# Patient Record
Sex: FEMALE | Race: OTHER | Employment: OTHER | URBAN - METROPOLITAN AREA
[De-identification: names, ages, dates, MRNs, and addresses within clinical notes are randomized per-mention and may not be internally consistent; named-entity substitution may affect disease eponyms.]

---

## 2017-01-04 ENCOUNTER — TELEPHONE (OUTPATIENT)
Dept: FAMILY MEDICINE CLINIC | Facility: CLINIC | Age: 82
End: 2017-01-04

## 2017-01-05 ENCOUNTER — OFFICE VISIT (OUTPATIENT)
Dept: FAMILY MEDICINE CLINIC | Facility: CLINIC | Age: 82
End: 2017-01-05

## 2017-01-05 VITALS
TEMPERATURE: 99 F | WEIGHT: 136 LBS | SYSTOLIC BLOOD PRESSURE: 135 MMHG | DIASTOLIC BLOOD PRESSURE: 65 MMHG | HEART RATE: 66 BPM

## 2017-01-05 DIAGNOSIS — B35.6 TINEA CRURIS: ICD-10-CM

## 2017-01-05 DIAGNOSIS — N30.00 ACUTE CYSTITIS WITHOUT HEMATURIA: Primary | ICD-10-CM

## 2017-01-05 LAB
APPEARANCE: CLEAR
BILIRUBIN: NEGATIVE
GLUCOSE (URINE DIPSTICK): NEGATIVE MG/DL
KETONES (URINE DIPSTICK): NEGATIVE MG/DL
MULTISTIX LOT#: NORMAL NUMERIC
NITRITE, URINE: NEGATIVE
PH, URINE: 6.5 (ref 4.5–8)
PROTEIN (URINE DIPSTICK): NEGATIVE MG/DL
SPECIFIC GRAVITY: 1.01 (ref 1–1.03)
URINE-COLOR: YELLOW
UROBILINOGEN,SEMI-QN: 0.2 MG/DL (ref 0–1.9)

## 2017-01-05 PROCEDURE — 99214 OFFICE O/P EST MOD 30 MIN: CPT | Performed by: FAMILY MEDICINE

## 2017-01-05 PROCEDURE — 81002 URINALYSIS NONAUTO W/O SCOPE: CPT | Performed by: FAMILY MEDICINE

## 2017-01-05 PROCEDURE — G0463 HOSPITAL OUTPT CLINIC VISIT: HCPCS | Performed by: FAMILY MEDICINE

## 2017-01-05 RX ORDER — MELATONIN
400 DAILY
COMMUNITY
End: 2017-06-13

## 2017-01-05 RX ORDER — BENAZEPRIL HYDROCHLORIDE 10 MG/1
TABLET ORAL
Qty: 90 TABLET | Refills: 1 | Status: SHIPPED | OUTPATIENT
Start: 2017-01-05 | End: 2017-06-13

## 2017-01-05 NOTE — PROGRESS NOTES
1/5/2017  4:01 PM    Misti Zavaleta is a 80year old female. Chief complaint(s): Patient presents with: Follow - Up    HPI:     Misti Zavaleta primary complaint is regarding UTI and rash.      Patient is a 27-year-old female with long history of Parki 09/19/2011 03/07/2013 08/28/2014 09/26/2015      Pneumovax 23 (Lot Mgr)                          09/26/2015      Zoster (Shingles)     07/25/2016      Medications (Active prior to today's visit):    Current Outp EXAM:   Physical Exam    Constitutional: She appears well-developed and well-nourished. /65 mmHg  Pulse 66  Temp(Src) 98.9 °F (37.2 °C) (Oral)  Wt 136 lb (61.689 kg)  Breastfeeding? No   HENT:   Head: Normocephalic.    Right Ear: External ear normal [14688]  Urine Culture, Routine [E]    RECOMMENDATIONS given include: increase oral fluid intake, drink cranberry juice, avoid \"holding\" urine, urinate after intercourse, and Please, call our office with any questions or concerns.  Notify the doctor if th Econazole Nitrate 1 % External Cream 30 g 1      Sig: Apply to affected area(s) BID. RECOMMENDATIONS given include: Please, call our office with any questions or concerns.  Notify Dr Yulia Lam or the [facility] if there is a deterioration or worse

## 2017-01-20 ENCOUNTER — TELEPHONE (OUTPATIENT)
Dept: FAMILY MEDICINE CLINIC | Facility: CLINIC | Age: 82
End: 2017-01-20

## 2017-01-20 NOTE — TELEPHONE ENCOUNTER
Spoke to Sy Block, she was wondering if there was a time limit for using the antifungal cream, econazole due to adverse effects. Apparently, the area has greatly improved but per the pt she still has some itching.    I advised HH to have the daughter continue

## 2017-01-20 NOTE — TELEPHONE ENCOUNTER
HHN questioning how long pt is to use econazole 1% antifungal cream ?  Said groin area skin is looking better, itching has improved  Still has some discoloration

## 2017-01-21 NOTE — TELEPHONE ENCOUNTER
Call transferred by CSS: Avery Aguayo informed of Dr FISH's recommendation for her to be seen for re-evaluation if not improving. Avery Aguayo states there is definitely improvement but she will have the daughter call for appt if issue has not resolved.

## 2017-01-25 ENCOUNTER — OFFICE VISIT (OUTPATIENT)
Dept: FAMILY MEDICINE CLINIC | Facility: CLINIC | Age: 82
End: 2017-01-25

## 2017-01-25 VITALS
TEMPERATURE: 98 F | WEIGHT: 136 LBS | SYSTOLIC BLOOD PRESSURE: 133 MMHG | HEART RATE: 72 BPM | DIASTOLIC BLOOD PRESSURE: 75 MMHG

## 2017-01-25 DIAGNOSIS — L22 DIAPER RASH: Primary | ICD-10-CM

## 2017-01-25 PROCEDURE — G0463 HOSPITAL OUTPT CLINIC VISIT: HCPCS | Performed by: FAMILY MEDICINE

## 2017-01-25 PROCEDURE — 99213 OFFICE O/P EST LOW 20 MIN: CPT | Performed by: FAMILY MEDICINE

## 2017-01-25 NOTE — PROGRESS NOTES
1/25/2017  1:04 PM    Misti Zavaleta is a 80year old female.     Chief complaint(s): Patient presents with:  Rash Skin Problem (integumentary): Patient here with daughter states that the home health nurse called and spoke with Dr. Rosa Zafar regarding pt's rash n 09/19/2011 03/07/2013 08/28/2014 09/26/2015      Pneumovax 23 (Lot Mgr)                          09/26/2015      Zoster (Shingles)     07/25/2016      Medications (Active prior to today's visit):    Navneet Pugh well-nourished. /75 mmHg  Pulse 72  Temp(Src) 98.1 °F (36.7 °C) (Oral)  Wt 136 lb (61.689 kg)  Breastfeeding? No   HENT:   Head: Normocephalic. Right Ear: External ear normal.   Left Ear: External ear normal.   Nose: No rhinorrhea.    Mouth/Throat daughter was explained to keep the area clean and dry. If possible keep the diaper off during the night so he can dry out. To use Desitin as needed. FOLLOW-UP: Schedule a follow-up visit in prn .              Orders This Visit:  No orders of the define

## 2017-02-07 ENCOUNTER — TELEPHONE (OUTPATIENT)
Dept: FAMILY MEDICINE CLINIC | Facility: CLINIC | Age: 82
End: 2017-02-07

## 2017-02-16 ENCOUNTER — TELEPHONE (OUTPATIENT)
Dept: FAMILY MEDICINE CLINIC | Facility: CLINIC | Age: 82
End: 2017-02-16

## 2017-02-16 RX ORDER — CODEINE PHOSPHATE AND GUAIFENESIN 10; 100 MG/5ML; MG/5ML
5 SOLUTION ORAL EVERY 6 HOURS PRN
Qty: 120 ML | Refills: 1 | Status: SHIPPED | OUTPATIENT
Start: 2017-02-16 | End: 2017-06-13 | Stop reason: ALTCHOICE

## 2017-02-16 NOTE — TELEPHONE ENCOUNTER
Pt's daughter called stating her mother woke up with a cold this morning symptoms include cough, runny nose and sore throat. Daughter wants to know what can she get OTC for mom? Please advise.

## 2017-02-17 ENCOUNTER — TELEPHONE (OUTPATIENT)
Dept: FAMILY MEDICINE CLINIC | Facility: CLINIC | Age: 82
End: 2017-02-17

## 2017-02-17 NOTE — TELEPHONE ENCOUNTER
Keira would like to know if doctor would like her to continue to see pt. Pt has completed her therapy and has reached her goal. Anil Gutiérrez would like to know if doctor would still like nursing for the patient or should they discharge pt.

## 2017-02-17 NOTE — TELEPHONE ENCOUNTER
Dr. John Mcallister, please see message below. Spoke to Sy Block, [facility] RN. She states that the pt is doing well. VS have been stable, the pt had a rash to her groin that is healed. She is wondering if you would like to continue RN services or can the pt be DC'd?  Please a

## 2017-02-20 NOTE — TELEPHONE ENCOUNTER
Faxed over RX to [facility] pharmacy at [phone]. Called to inform patient rx was sent over.

## 2017-02-20 NOTE — TELEPHONE ENCOUNTER
Keira [facility] RN is calling back   Nurse Collette Rangel will be driving to her home now and have not got a response from MD   Please advise

## 2017-06-12 ENCOUNTER — TELEPHONE (OUTPATIENT)
Dept: INTERNAL MEDICINE CLINIC | Facility: CLINIC | Age: 82
End: 2017-06-12

## 2017-06-12 NOTE — TELEPHONE ENCOUNTER
Actions Requested: appt scheduled 6/13/17  Situation/Background   Problem: pimple to back   Onset: > 2days   Associated Symptoms: afebrile, pus visible not popped, painful red swollen, unable to describe the size as caller was son and pt Cape Verdean speaking

## 2017-06-13 ENCOUNTER — OFFICE VISIT (OUTPATIENT)
Dept: FAMILY MEDICINE CLINIC | Facility: CLINIC | Age: 82
End: 2017-06-13

## 2017-06-13 VITALS
HEART RATE: 67 BPM | TEMPERATURE: 98 F | WEIGHT: 142 LBS | BODY MASS INDEX: 28.63 KG/M2 | HEIGHT: 59 IN | DIASTOLIC BLOOD PRESSURE: 77 MMHG | SYSTOLIC BLOOD PRESSURE: 149 MMHG

## 2017-06-13 DIAGNOSIS — L89.311 DECUBITUS ULCER OF RIGHT BUTTOCK, STAGE 1: Primary | ICD-10-CM

## 2017-06-13 DIAGNOSIS — B35.1 ONYCHOMYCOSIS: ICD-10-CM

## 2017-06-13 DIAGNOSIS — I10 ESSENTIAL HYPERTENSION: ICD-10-CM

## 2017-06-13 PROCEDURE — G0463 HOSPITAL OUTPT CLINIC VISIT: HCPCS | Performed by: FAMILY MEDICINE

## 2017-06-13 PROCEDURE — 99214 OFFICE O/P EST MOD 30 MIN: CPT | Performed by: FAMILY MEDICINE

## 2017-06-13 RX ORDER — MELATONIN
400 DAILY
Qty: 90 TABLET | Refills: 2 | Status: SHIPPED | OUTPATIENT
Start: 2017-06-13 | End: 2018-03-12

## 2017-06-13 RX ORDER — CARVEDILOL 12.5 MG/1
12.5 TABLET ORAL 2 TIMES DAILY WITH MEALS
Qty: 180 TABLET | Refills: 2 | Status: SHIPPED | OUTPATIENT
Start: 2017-06-13 | End: 2018-03-12

## 2017-06-13 RX ORDER — BENAZEPRIL HYDROCHLORIDE 10 MG/1
TABLET ORAL
Qty: 90 TABLET | Refills: 1 | Status: SHIPPED | OUTPATIENT
Start: 2017-06-13 | End: 2018-01-09

## 2017-06-13 NOTE — PROGRESS NOTES
6/13/2017  1:36 PM    Collin Field is a 80year old female. Chief complaint(s): Patient presents with: Follow - Up: Patient here today for her regular check ups and also has a blister on her right buttock.      HPI:     Collin Field primary compla Gastroesophageal reflux disease    • Osteoarthritis    • Osteoporosis    • Parkinson's disease ([facility] [address]           Past Surgical History    APPENDECTOMY        Family History   Problem Relation Age of Onset   • Stroke Mother      cerebrovascular accident   • H MG Oral Tab EC 1 tablet po Q Day Disp:  Rfl:    Memantine HCl 10 MG Oral Tab  Disp:  Rfl: 11   Econazole Nitrate 1 % External Cream Apply to affected area(s) BID.  Disp: 30 g Rfl: 1   Mometasone Furoate 0.1 % External Cream Apply to affected area(s) BID Dis Result Value Ref Range   GLUCOSE (URINE DIPSTICK) NEGATIVE Negative mg/dL   BILIRUBIN NEGATIVE Negative   KETONES (URINE DIPSTICK) NEGATIVE Negative mg/dL   SPECIFIC GRAVITY 1.010 1.005 - 1.030   OCCULT BLOOD NON-HEMOLYZED TRACE Negative   PH, URINE 6.5 concerns. Notify Dr Yulia Lma or the [facility], [facility] if there is a deterioration or worsening of the medical condition. Also, inform the doctor with any new symptoms or medications' side effects. FOLLOW-UP: Schedule a follow-up visit in prn .         3.

## 2017-09-21 ENCOUNTER — OFFICE VISIT (OUTPATIENT)
Dept: FAMILY MEDICINE CLINIC | Facility: CLINIC | Age: 82
End: 2017-09-21

## 2017-09-21 VITALS
WEIGHT: 144 LBS | HEIGHT: 59 IN | HEART RATE: 82 BPM | SYSTOLIC BLOOD PRESSURE: 133 MMHG | TEMPERATURE: 98 F | DIASTOLIC BLOOD PRESSURE: 81 MMHG | BODY MASS INDEX: 29.03 KG/M2

## 2017-09-21 DIAGNOSIS — I10 ESSENTIAL HYPERTENSION: Primary | ICD-10-CM

## 2017-09-21 DIAGNOSIS — Z23 INFLUENZA VACCINATION ADMINISTERED AT CURRENT VISIT: ICD-10-CM

## 2017-09-21 DIAGNOSIS — I48.20 CHRONIC ATRIAL FIBRILLATION (HCC): ICD-10-CM

## 2017-09-21 PROCEDURE — G0008 ADMIN INFLUENZA VIRUS VAC: HCPCS | Performed by: FAMILY MEDICINE

## 2017-09-21 PROCEDURE — 99214 OFFICE O/P EST MOD 30 MIN: CPT | Performed by: FAMILY MEDICINE

## 2017-09-21 PROCEDURE — G0463 HOSPITAL OUTPT CLINIC VISIT: HCPCS | Performed by: FAMILY MEDICINE

## 2017-09-21 PROCEDURE — 90653 IIV ADJUVANT VACCINE IM: CPT | Performed by: FAMILY MEDICINE

## 2017-09-21 NOTE — PROGRESS NOTES
9/21/2017  3:14 PM    Collin Field is a 80year old female. Chief complaint(s): Patient presents with: Follow - Up  HTN    HPI:     Collin Field primary complaint is regarding CAD/HTN/A fib.      With regard to the atrial fibrillation, HTN she is Sister      hyperlipidemia   • Diabetes Sister      type 2   • Lung Disorder Father      pneumonia      Social History: Smoking status: Never Smoker                                                              Smokeless tobacco: Never Used throat. Respiratory: Negative for cough and wheezing. Cardiovascular: Negative for chest pain and palpitations. Gastrointestinal: Negative for nausea, vomiting, abdominal pain, diarrhea and constipation. Musculoskeletal: Negative for back pain. Negative   APPEARANCE CLEAR Clear   URINE-COLOR YELLOW Yellow   Multistix Lot# [id number] Numeric   Multistix Expiration Date 10/31/2017 Date   -URINE CULTURE, ROUTINE   Result Value Ref Range   Urine Culture 10-50,000 cfu/ml Multiple species present-probable

## 2017-09-22 ENCOUNTER — TELEPHONE (OUTPATIENT)
Dept: OBGYN CLINIC | Facility: CLINIC | Age: 82
End: 2017-09-22

## 2017-09-22 NOTE — TELEPHONE ENCOUNTER
I do not understand this message. I do not believe this patient has been seen by me or anyone from our group. I do not see any pending appointment.

## 2017-09-22 NOTE — TELEPHONE ENCOUNTER
Daughter is wondering if pt can get urine culture test as well. Will bring pt in for blood test tomorrow.

## 2017-09-23 ENCOUNTER — LAB ENCOUNTER (OUTPATIENT)
Dept: LAB | Age: 82
End: 2017-09-23
Attending: FAMILY MEDICINE
Payer: MEDICARE

## 2017-09-23 DIAGNOSIS — I48.20 CHRONIC ATRIAL FIBRILLATION (HCC): ICD-10-CM

## 2017-09-23 LAB
ALBUMIN SERPL BCP-MCNC: 3.6 G/DL (ref 3.5–4.8)
ALBUMIN/GLOB SERPL: 1 {RATIO} (ref 1–2)
ALP SERPL-CCNC: 78 U/L (ref 32–100)
ALT SERPL-CCNC: 12 U/L (ref 14–54)
ANION GAP SERPL CALC-SCNC: 6 MMOL/L (ref 0–18)
AST SERPL-CCNC: 23 U/L (ref 15–41)
BASOPHILS # BLD: 0.1 K/UL (ref 0–0.2)
BASOPHILS NFR BLD: 1 %
BILIRUB SERPL-MCNC: 0.7 MG/DL (ref 0.3–1.2)
BUN SERPL-MCNC: 29 MG/DL (ref 8–20)
BUN/CREAT SERPL: 20.6 (ref 10–20)
CALCIUM SERPL-MCNC: 9.1 MG/DL (ref 8.5–10.5)
CHLORIDE SERPL-SCNC: 109 MMOL/L (ref 95–110)
CHOLEST SERPL-MCNC: 206 MG/DL (ref 110–200)
CO2 SERPL-SCNC: 26 MMOL/L (ref 22–32)
CREAT SERPL-MCNC: 1.41 MG/DL (ref 0.5–1.5)
EOSINOPHIL # BLD: 0.2 K/UL (ref 0–0.7)
EOSINOPHIL NFR BLD: 3 %
ERYTHROCYTE [DISTWIDTH] IN BLOOD BY AUTOMATED COUNT: 13.5 % (ref 11–15)
GLOBULIN PLAS-MCNC: 3.5 G/DL (ref 2.5–3.7)
GLUCOSE SERPL-MCNC: 92 MG/DL (ref 70–99)
HCT VFR BLD AUTO: 38 % (ref 35–48)
HDLC SERPL-MCNC: 50 MG/DL
HGB BLD-MCNC: 12.7 G/DL (ref 12–16)
LDLC SERPL CALC-MCNC: 137 MG/DL (ref 0–99)
LYMPHOCYTES # BLD: 1.3 K/UL (ref 1–4)
LYMPHOCYTES NFR BLD: 22 %
MCH RBC QN AUTO: 31.3 PG (ref 27–32)
MCHC RBC AUTO-ENTMCNC: 33.3 G/DL (ref 32–37)
MCV RBC AUTO: 93.8 FL (ref 80–100)
MONOCYTES # BLD: 0.7 K/UL (ref 0–1)
MONOCYTES NFR BLD: 13 %
NEUTROPHILS # BLD AUTO: 3.6 K/UL (ref 1.8–7.7)
NEUTROPHILS NFR BLD: 62 %
NONHDLC SERPL-MCNC: 156 MG/DL
OSMOLALITY UR CALC.SUM OF ELEC: 297 MOSM/KG (ref 275–295)
PLATELET # BLD AUTO: 169 K/UL (ref 140–400)
PMV BLD AUTO: 9.7 FL (ref 7.4–10.3)
POTASSIUM SERPL-SCNC: 5 MMOL/L (ref 3.3–5.1)
PROT SERPL-MCNC: 7.1 G/DL (ref 5.9–8.4)
RBC # BLD AUTO: 4.05 M/UL (ref 3.7–5.4)
SODIUM SERPL-SCNC: 141 MMOL/L (ref 136–144)
TRIGL SERPL-MCNC: 97 MG/DL (ref 1–149)
TSH SERPL-ACNC: 2.97 UIU/ML (ref 0.45–5.33)
WBC # BLD AUTO: 5.8 K/UL (ref 4–11)

## 2017-09-23 PROCEDURE — 36415 COLL VENOUS BLD VENIPUNCTURE: CPT

## 2017-09-23 PROCEDURE — 80053 COMPREHEN METABOLIC PANEL: CPT

## 2017-09-23 PROCEDURE — 80061 LIPID PANEL: CPT

## 2017-09-23 PROCEDURE — 84443 ASSAY THYROID STIM HORMONE: CPT

## 2017-09-23 PROCEDURE — 85025 COMPLETE CBC W/AUTO DIFF WBC: CPT

## 2017-12-04 RX ORDER — ERGOCALCIFEROL 1.25 MG/1
CAPSULE ORAL
Qty: 12 CAPSULE | Refills: 0 | Status: SHIPPED | OUTPATIENT
Start: 2017-12-04 | End: 2018-02-16

## 2018-01-11 RX ORDER — BENAZEPRIL HYDROCHLORIDE 10 MG/1
TABLET ORAL
Qty: 90 TABLET | Refills: 0 | Status: SHIPPED | OUTPATIENT
Start: 2018-01-11 | End: 2018-03-22

## 2018-01-30 ENCOUNTER — TELEPHONE (OUTPATIENT)
Dept: FAMILY MEDICINE CLINIC | Facility: CLINIC | Age: 83
End: 2018-01-30

## 2018-01-30 NOTE — TELEPHONE ENCOUNTER
S[place]ankan speaking - pt's daughter called in and want to discuss the appt that happened on 9/23/2017. PT's daughter also wanted to know why labs were ordered.  Please advise

## 2018-02-01 NOTE — TELEPHONE ENCOUNTER
Please contact patient to set up an appointment with Dr. Richard Wills to discuss last appointment from 9/2017.

## 2018-02-15 RX ORDER — MEMANTINE HYDROCHLORIDE 10 MG/1
10 TABLET ORAL DAILY
Qty: 90 TABLET | Refills: 2 | Status: SHIPPED | OUTPATIENT
Start: 2018-02-15 | End: 2018-03-22

## 2018-02-15 NOTE — TELEPHONE ENCOUNTER
Dr. Collin Augustin patient's daughter is requesting more refills for medication MEMANTINE HCI 10MG. Please advise.

## 2018-02-16 RX ORDER — ERGOCALCIFEROL 1.25 MG/1
CAPSULE ORAL
Qty: 12 CAPSULE | Refills: 0 | Status: SHIPPED | OUTPATIENT
Start: 2018-02-16 | End: 2018-05-22

## 2018-03-13 RX ORDER — CARVEDILOL 12.5 MG/1
TABLET ORAL
Qty: 180 TABLET | Refills: 0 | Status: SHIPPED | OUTPATIENT
Start: 2018-03-13 | End: 2018-03-22

## 2018-03-13 RX ORDER — MELATONIN
Qty: 90 TABLET | Refills: 0 | Status: SHIPPED | OUTPATIENT
Start: 2018-03-13 | End: 2018-03-22

## 2018-03-22 ENCOUNTER — OFFICE VISIT (OUTPATIENT)
Dept: FAMILY MEDICINE CLINIC | Facility: CLINIC | Age: 83
End: 2018-03-22

## 2018-03-22 VITALS — HEART RATE: 85 BPM | TEMPERATURE: 98 F | SYSTOLIC BLOOD PRESSURE: 115 MMHG | DIASTOLIC BLOOD PRESSURE: 81 MMHG

## 2018-03-22 DIAGNOSIS — I48.20 CHRONIC ATRIAL FIBRILLATION (HCC): ICD-10-CM

## 2018-03-22 DIAGNOSIS — F02.80 LATE ONSET ALZHEIMER'S DISEASE WITHOUT BEHAVIORAL DISTURBANCE (HCC): ICD-10-CM

## 2018-03-22 DIAGNOSIS — G30.1 LATE ONSET ALZHEIMER'S DISEASE WITHOUT BEHAVIORAL DISTURBANCE (HCC): ICD-10-CM

## 2018-03-22 DIAGNOSIS — I10 ESSENTIAL HYPERTENSION: Primary | ICD-10-CM

## 2018-03-22 PROCEDURE — 99214 OFFICE O/P EST MOD 30 MIN: CPT | Performed by: FAMILY MEDICINE

## 2018-03-22 PROCEDURE — G0463 HOSPITAL OUTPT CLINIC VISIT: HCPCS | Performed by: FAMILY MEDICINE

## 2018-03-22 RX ORDER — CARVEDILOL 12.5 MG/1
TABLET ORAL
Qty: 180 TABLET | Refills: 2 | Status: SHIPPED | OUTPATIENT
Start: 2018-03-22 | End: 2019-02-21

## 2018-03-22 RX ORDER — MEMANTINE HYDROCHLORIDE 10 MG/1
10 TABLET ORAL 2 TIMES DAILY
Qty: 180 TABLET | Refills: 2 | Status: SHIPPED | OUTPATIENT
Start: 2018-03-22 | End: 2018-12-09

## 2018-03-22 RX ORDER — MELATONIN
Qty: 90 TABLET | Refills: 2 | Status: SHIPPED | OUTPATIENT
Start: 2018-03-22

## 2018-03-22 RX ORDER — QUETIAPINE 25 MG/1
100 TABLET, FILM COATED ORAL DAILY
COMMUNITY
Start: 2018-02-27 | End: 2019-01-10

## 2018-03-22 RX ORDER — PAROXETINE 30 MG/1
TABLET, FILM COATED ORAL
Qty: 90 TABLET | Refills: 1 | Status: SHIPPED | OUTPATIENT
Start: 2018-03-22

## 2018-03-22 RX ORDER — BENAZEPRIL HYDROCHLORIDE 10 MG/1
10 TABLET ORAL
Qty: 90 TABLET | Refills: 2 | Status: SHIPPED | OUTPATIENT
Start: 2018-03-22 | End: 2019-02-21

## 2018-03-22 NOTE — PROGRESS NOTES
3/22/2018  2:13 PM    Lucita Dyson is a 80year old female. Chief complaint(s): Patient presents with: Follow - Up    HPI:     Lucita Dyson primary complaint is regarding multiple issues. Patient to be evaluated for atrial fibrillation.   She • Congestive heart failure (CHF) (HCC)    • Gastroesophageal reflux disease    • Hypertension    • Osteoarthritis    • Osteoporosis    • Parkinson's disease ([facility] [address]    • Varicose veins       Past Surgical History:  No date: APPENDECTOMY   Family History TABLET(12.5 MG) BY MOUTH TWICE DAILY WITH MEALS Disp: 180 tablet Rfl: 2   ERGOCALCIFEROL 73403 units Oral Cap TAKE ONE CAPSULE BY MOUTH ONCE A WEEK Disp: 12 capsule Rfl: 0   ZOSTAVAX 10597 UNT/0.65ML Subcutaneous Recon Soln  Disp:  Rfl:    Acetaminophen (A Essential hypertension  (primary encounter diagnosis)  Chronic atrial fibrillation (hcc)  Late onset alzheimer's disease without behavioral disturbance    1. Essential hypertension  2.  Chronic atrial fibrillation (HCC)    MEDICATIONS:     Signed Prescrip Oral Tab 180 tablet 2      Sig: Take 1 tablet (10 mg total) by mouth 2 (two) times daily. DilTIAZem HCl 30 MG Oral Tab 270 tablet 2      Sig: Take 1 tablet (30 mg total) by mouth 3 (three) times daily.       PARoxetine HCl 30 MG Oral Tab 90 tablet 1

## 2018-03-26 ENCOUNTER — NURSE TRIAGE (OUTPATIENT)
Dept: FAMILY MEDICINE CLINIC | Facility: CLINIC | Age: 83
End: 2018-03-26

## 2018-03-26 ENCOUNTER — OFFICE VISIT (OUTPATIENT)
Dept: FAMILY MEDICINE CLINIC | Facility: CLINIC | Age: 83
End: 2018-03-26

## 2018-03-26 VITALS
DIASTOLIC BLOOD PRESSURE: 81 MMHG | BODY MASS INDEX: 28.66 KG/M2 | TEMPERATURE: 98 F | WEIGHT: 146 LBS | SYSTOLIC BLOOD PRESSURE: 158 MMHG | HEART RATE: 58 BPM | HEIGHT: 60 IN

## 2018-03-26 DIAGNOSIS — N30.00 ACUTE CYSTITIS WITHOUT HEMATURIA: Primary | ICD-10-CM

## 2018-03-26 LAB
APPEARANCE: CLEAR
BILIRUBIN: NEGATIVE
GLUCOSE (URINE DIPSTICK): NEGATIVE MG/DL
KETONES (URINE DIPSTICK): NEGATIVE MG/DL
MULTISTIX LOT#: NORMAL NUMERIC
PH, URINE: 6.5 (ref 4.5–8)
PROTEIN (URINE DIPSTICK): NEGATIVE MG/DL
SPECIFIC GRAVITY: 1.01 (ref 1–1.03)
URINE-COLOR: YELLOW
UROBILINOGEN,SEMI-QN: 0.2 MG/DL (ref 0–1.9)

## 2018-03-26 PROCEDURE — G0463 HOSPITAL OUTPT CLINIC VISIT: HCPCS | Performed by: FAMILY MEDICINE

## 2018-03-26 PROCEDURE — 81002 URINALYSIS NONAUTO W/O SCOPE: CPT | Performed by: FAMILY MEDICINE

## 2018-03-26 PROCEDURE — 99214 OFFICE O/P EST MOD 30 MIN: CPT | Performed by: FAMILY MEDICINE

## 2018-03-26 RX ORDER — CIPROFLOXACIN 500 MG/1
500 TABLET, FILM COATED ORAL 2 TIMES DAILY
Qty: 14 TABLET | Refills: 0 | Status: SHIPPED | OUTPATIENT
Start: 2018-03-26 | End: 2018-04-02

## 2018-03-26 NOTE — PROGRESS NOTES
3/26/2018  5:03 PM    Roseann Gutiérrez is a 80year old female. Chief complaint(s): Patient presents with:   Foot Pain: daughter states she noticed her mom not able to walk normal this morning     HPI:     Roseann Gutiérrez primary complaint is regarding po Vaccine, High Dose, Preserv Free                          09/19/2011 03/07/2013 08/28/2014 09/26/2015      Pneumovax 23          09/26/2015      Zoster Vaccine Live (Zostavax)                          07/25/2016      Medication PHYSICAL EXAM:   Physical Exam    Constitutional: She is oriented to person, place, and time. She appears well-developed and well-nourished.    /81   Pulse 58   Temp 97.8 °F (36.6 °C) (Oral)   Ht 5' (1.524 m)   Wt 146 lb (66.2 kg)   BMI 28.51 kg follow-up appointment in  prn.          Orders This Visit:    Orders Placed This Encounter      POC Urinalysis, Manual Dip without microscopy [09691]      Urine Culture, Routine [E]    Meds This Visit:    Signed Prescriptions Disp Refills    Ciprofloxacin H

## 2018-03-26 NOTE — TELEPHONE ENCOUNTER
Action Requested: Summary for Provider     []  Critical Lab, Recommendations Needed  [] Need Additional Advice  []   FYI    []   Need Orders  [] Need Medications Sent to Pharmacy  []  Other     SUMMARY: CREATED APPT, weakness, poss UTI    Pt's daughter yelena

## 2018-05-23 RX ORDER — ERGOCALCIFEROL 1.25 MG/1
CAPSULE ORAL
Qty: 12 CAPSULE | Refills: 1 | Status: SHIPPED | OUTPATIENT
Start: 2018-05-23 | End: 2018-11-19

## 2018-11-05 RX ORDER — ERGOCALCIFEROL 1.25 MG/1
CAPSULE ORAL
Qty: 12 CAPSULE | Refills: 0 | OUTPATIENT
Start: 2018-11-05

## 2018-11-16 ENCOUNTER — OFFICE VISIT (OUTPATIENT)
Dept: FAMILY MEDICINE CLINIC | Facility: CLINIC | Age: 83
End: 2018-11-16
Payer: MEDICARE

## 2018-11-16 ENCOUNTER — LAB ENCOUNTER (OUTPATIENT)
Dept: LAB | Age: 83
End: 2018-11-16
Attending: FAMILY MEDICINE
Payer: MEDICARE

## 2018-11-16 VITALS
TEMPERATURE: 98 F | SYSTOLIC BLOOD PRESSURE: 123 MMHG | RESPIRATION RATE: 16 BRPM | HEART RATE: 65 BPM | DIASTOLIC BLOOD PRESSURE: 71 MMHG

## 2018-11-16 DIAGNOSIS — R30.0 DYSURIA: ICD-10-CM

## 2018-11-16 DIAGNOSIS — F02.80 LATE ONSET ALZHEIMER'S DISEASE WITHOUT BEHAVIORAL DISTURBANCE (HCC): ICD-10-CM

## 2018-11-16 DIAGNOSIS — I10 ESSENTIAL HYPERTENSION: ICD-10-CM

## 2018-11-16 DIAGNOSIS — G30.1 LATE ONSET ALZHEIMER'S DISEASE WITHOUT BEHAVIORAL DISTURBANCE (HCC): ICD-10-CM

## 2018-11-16 DIAGNOSIS — I48.20 CHRONIC ATRIAL FIBRILLATION (HCC): Primary | ICD-10-CM

## 2018-11-16 DIAGNOSIS — I48.20 CHRONIC ATRIAL FIBRILLATION (HCC): ICD-10-CM

## 2018-11-16 DIAGNOSIS — E55.9 HYPOVITAMINOSIS D: ICD-10-CM

## 2018-11-16 PROCEDURE — 90653 IIV ADJUVANT VACCINE IM: CPT | Performed by: FAMILY MEDICINE

## 2018-11-16 PROCEDURE — 36415 COLL VENOUS BLD VENIPUNCTURE: CPT

## 2018-11-16 PROCEDURE — 80053 COMPREHEN METABOLIC PANEL: CPT

## 2018-11-16 PROCEDURE — 85025 COMPLETE CBC W/AUTO DIFF WBC: CPT

## 2018-11-16 PROCEDURE — 81003 URINALYSIS AUTO W/O SCOPE: CPT | Performed by: FAMILY MEDICINE

## 2018-11-16 PROCEDURE — 82306 VITAMIN D 25 HYDROXY: CPT | Performed by: FAMILY MEDICINE

## 2018-11-16 PROCEDURE — 99214 OFFICE O/P EST MOD 30 MIN: CPT | Performed by: FAMILY MEDICINE

## 2018-11-16 PROCEDURE — G0008 ADMIN INFLUENZA VIRUS VAC: HCPCS | Performed by: FAMILY MEDICINE

## 2018-11-16 PROCEDURE — G0463 HOSPITAL OUTPT CLINIC VISIT: HCPCS | Performed by: FAMILY MEDICINE

## 2018-11-16 RX ORDER — DONEPEZIL HYDROCHLORIDE 5 MG/1
5 TABLET, ORALLY DISINTEGRATING ORAL NIGHTLY
Qty: 30 TABLET | Refills: 1 | OUTPATIENT
Start: 2018-11-16 | End: 2019-07-22

## 2018-11-16 RX ORDER — OSTOMY SUPPLY 1"
EACH MISCELLANEOUS
Qty: 10 EACH | Refills: 2 | Status: SHIPPED | OUTPATIENT
Start: 2018-11-16 | End: 2019-03-13

## 2018-11-16 NOTE — PROGRESS NOTES
11/16/2018  12:41 PM    Rupa Grant is a 80year old female. Chief complaint(s): Patient presents with:  Atrial Fibrillation  Painful Urination    HPI:     Rupa Grant primary complaint is regarding multiple complaints.      Patient to be evaluat oz      Comment: Does not drink alcohol and never has.     Drug use: No       Immunizations:     Immunization History  Administered            Date(s) Administered    FLU VACC High Dose 72 YRS & Older PRSV Free (00809)                          09/19/2011  0 Allergies:  No Known Allergies      ROS:   Review of Systems   Constitutional: Negative for chills, fatigue and fever. HENT: Negative for ear pain and sore throat. Respiratory: Negative for cough and wheezing.     Cardiovascular: Negative for jennifer 1.9 mg/dL    NITRITE, URINE Negative Negative    LEUKOCYTES Trace Negative    APPEARANCE Clear Clear    URINE-COLOR Yellow Yellow    Multistix Lot# [id number] Numeric    Multistix Expiration Date 01- Date       EKG / Spirometry : -     Radiology: No re Take 1 tablet (5 mg total) by mouth nightly. • Control Gel Formula Dressing (DUODERM CGF EXTRA THIN) External Misc 10 each 2     Sig: Use as directed   .   LABORATORY & ORDERS: Orders Placed This Encounter      POC Urinalysis, Automated Dip without micros Dysuria    LABORATORY & ORDERS: Orders Placed This Encounter      POC Urinalysis, Automated Dip without microscopy (PCA and EMMG ONLY) [04113]      Vitamin D, 25-Hydroxy [E]      **CBC W Differential W Platelet [E]      **CMP  Comp Metabolic Panel (14) [E] Urine Culture, Routine [E]      Meds This Visit:  Requested Prescriptions     Signed Prescriptions Disp Refills   • Donepezil HCl 5 MG Oral Tablet Dispersible 30 tablet 1     Sig: Take 1 tablet (5 mg total) by mouth nightly.    • Control Gel Formula Dressin

## 2018-11-18 RX ORDER — NITROFURANTOIN 25; 75 MG/1; MG/1
100 CAPSULE ORAL 2 TIMES DAILY
Qty: 20 CAPSULE | Refills: 0 | Status: SHIPPED | OUTPATIENT
Start: 2018-11-18 | End: 2018-11-28

## 2018-11-19 ENCOUNTER — TELEPHONE (OUTPATIENT)
Dept: FAMILY MEDICINE CLINIC | Facility: CLINIC | Age: 83
End: 2018-11-19

## 2018-11-19 RX ORDER — ERGOCALCIFEROL 1.25 MG/1
CAPSULE ORAL
Qty: 12 CAPSULE | Refills: 1 | Status: SHIPPED | OUTPATIENT
Start: 2018-11-19 | End: 2019-02-07

## 2018-11-19 NOTE — TELEPHONE ENCOUNTER
----- Message from Yazmin Erickson MD sent at 11/18/2018 12:14 PM CST -----  Please call patient to set up a follow up appointment due to abnormal results. Abnormal results include: cmp.

## 2018-11-19 NOTE — PROGRESS NOTES
Called with the assistance of language line solutions (#). [phone] [facility] verified, contacted daughter Judy Beltran, relayed Dr Kymberly Rodriguez message, they will start abx and push fluids   appt scheduled to follow up on abnormal CMP 12/4/18 [id number]

## 2018-11-19 NOTE — TELEPHONE ENCOUNTER
[place] please contact pt to help set up an appointment to discuss abnormal lab results with Dr. Elizabeth Kim. Next week, next available slot.

## 2018-12-04 ENCOUNTER — OFFICE VISIT (OUTPATIENT)
Dept: FAMILY MEDICINE CLINIC | Facility: CLINIC | Age: 83
End: 2018-12-04
Payer: MEDICARE

## 2018-12-04 ENCOUNTER — APPOINTMENT (OUTPATIENT)
Dept: LAB | Age: 83
End: 2018-12-04
Attending: FAMILY MEDICINE
Payer: MEDICARE

## 2018-12-04 VITALS — TEMPERATURE: 98 F | DIASTOLIC BLOOD PRESSURE: 69 MMHG | SYSTOLIC BLOOD PRESSURE: 124 MMHG | HEART RATE: 61 BPM

## 2018-12-04 DIAGNOSIS — N30.00 ACUTE CYSTITIS WITHOUT HEMATURIA: ICD-10-CM

## 2018-12-04 DIAGNOSIS — N18.1 CRI (CHRONIC RENAL INSUFFICIENCY), STAGE 1: Primary | ICD-10-CM

## 2018-12-04 DIAGNOSIS — I10 ESSENTIAL HYPERTENSION: ICD-10-CM

## 2018-12-04 PROCEDURE — G0463 HOSPITAL OUTPT CLINIC VISIT: HCPCS | Performed by: FAMILY MEDICINE

## 2018-12-04 PROCEDURE — 87086 URINE CULTURE/COLONY COUNT: CPT

## 2018-12-04 PROCEDURE — 99213 OFFICE O/P EST LOW 20 MIN: CPT | Performed by: FAMILY MEDICINE

## 2018-12-04 RX ORDER — BLOOD PRESSURE TEST KIT
KIT MISCELLANEOUS
Qty: 1 KIT | Refills: 0 | Status: SHIPPED | OUTPATIENT
Start: 2018-12-04

## 2018-12-10 RX ORDER — MEMANTINE HYDROCHLORIDE 10 MG/1
TABLET ORAL
Qty: 180 TABLET | Refills: 0 | Status: SHIPPED | OUTPATIENT
Start: 2018-12-10

## 2018-12-24 ENCOUNTER — TELEPHONE (OUTPATIENT)
Dept: FAMILY MEDICINE CLINIC | Facility: CLINIC | Age: 83
End: 2018-12-24

## 2018-12-24 NOTE — TELEPHONE ENCOUNTER
Dr Krystian Miller for Dr Liliana Grijalva, please note. Daughter (on HIPAA) stated patient's blood pressure went up to 190/95 last night at 7 pm, today now it is 126/80. Last night when blood pressure was elevated, her head was shaking and she had palpitations.  Daughter

## 2018-12-26 ENCOUNTER — OFFICE VISIT (OUTPATIENT)
Dept: FAMILY MEDICINE CLINIC | Facility: CLINIC | Age: 83
End: 2018-12-26
Payer: MEDICARE

## 2018-12-26 VITALS — HEART RATE: 64 BPM | TEMPERATURE: 98 F | SYSTOLIC BLOOD PRESSURE: 96 MMHG | DIASTOLIC BLOOD PRESSURE: 49 MMHG

## 2018-12-26 DIAGNOSIS — J18.9 COMMUNITY ACQUIRED PNEUMONIA OF LEFT UPPER LOBE OF LUNG: Primary | ICD-10-CM

## 2018-12-26 PROCEDURE — G0463 HOSPITAL OUTPT CLINIC VISIT: HCPCS | Performed by: FAMILY MEDICINE

## 2018-12-26 PROCEDURE — 96372 THER/PROPH/DIAG INJ SC/IM: CPT | Performed by: FAMILY MEDICINE

## 2018-12-26 PROCEDURE — 99214 OFFICE O/P EST MOD 30 MIN: CPT | Performed by: FAMILY MEDICINE

## 2018-12-26 RX ORDER — ERYTHROMYCIN 500 MG/1
500 TABLET, COATED ORAL
Qty: 20 TABLET | Refills: 0 | Status: SHIPPED | OUTPATIENT
Start: 2018-12-26 | End: 2018-12-26

## 2018-12-26 RX ORDER — CEFTRIAXONE 1 G/1
1000 INJECTION, POWDER, FOR SOLUTION INTRAMUSCULAR; INTRAVENOUS ONCE
Status: COMPLETED | OUTPATIENT
Start: 2018-12-26 | End: 2018-12-26

## 2018-12-26 RX ORDER — ALBUTEROL SULFATE 90 UG/1
2 AEROSOL, METERED RESPIRATORY (INHALATION) EVERY 6 HOURS PRN
Qty: 1 INHALER | Refills: 3 | Status: SHIPPED | OUTPATIENT
Start: 2018-12-26 | End: 2019-03-13 | Stop reason: ALTCHOICE

## 2018-12-26 RX ORDER — ERYTHROMYCIN 500 MG/1
500 TABLET, COATED ORAL 2 TIMES DAILY WITH MEALS
Qty: 20 TABLET | Refills: 0 | Status: SHIPPED | OUTPATIENT
Start: 2018-12-26 | End: 2019-01-10 | Stop reason: ALTCHOICE

## 2018-12-26 RX ADMIN — CEFTRIAXONE 1000 MG: 1 INJECTION, POWDER, FOR SOLUTION INTRAMUSCULAR; INTRAVENOUS at 11:21:00

## 2018-12-26 NOTE — PROGRESS NOTES
2018 10:44 AM    Adolfo Simmons, : 1929  Patient presents with:  Cough: with phelgm   Sore Throat: X 2 days  Body ache and/or chills    HPI:     Adolfo Simmons is a 80year old female who presents for evaluation of a chief complaint of runn • Osteoporosis    • Parkinson's disease ([facility] [address]    • Varicose veins        Past Surgical History:   Past Surgical History:   Procedure Laterality Date   • APPENDECTOMY         Social History: Social History    Socioeconomic History      Marital status:  Wid tablet Rfl: 0   Blood Pressure Does not apply Kit Use as directed Disp: 1 kit Rfl: 0   ergocalciferol 73548 units Oral Cap TAKE ONE CAPSULE BY MOUTH ONCE A WEEK Disp: 12 capsule Rfl: 1   Donepezil HCl 5 MG Oral Tablet Dispersible Take 1 tablet (5 mg total) hour(s)). MDM/Assessment/Plan:   Assessment and Plan:    Diagnosis:    ICD-10-CM    1.  Community acquired pneumonia of left upper lobe of lung ([facility] [address] J18.1        MEDICATIONS: •  Albuterol Sulfate  (90 Base) MCG/ACT Inhalation Aero Soln, Inhale 2 condition. Also, inform the doctor with any new symptoms or medications' side effects. .    FOLLOW-UP:  Instructed to call if new or worsening symptoms develop. Schedule a follow-up appointment 2 weeks.          Orders Placed This Encounter      CefTRIAXone

## 2019-01-10 ENCOUNTER — HOSPITAL ENCOUNTER (OUTPATIENT)
Dept: GENERAL RADIOLOGY | Age: 84
Discharge: HOME | End: 2019-01-10
Attending: FAMILY MEDICINE | Admitting: FAMILY MEDICINE
Payer: MEDICARE

## 2019-01-10 ENCOUNTER — OFFICE VISIT (OUTPATIENT)
Dept: FAMILY MEDICINE CLINIC | Facility: CLINIC | Age: 84
End: 2019-01-10
Payer: MEDICARE

## 2019-01-10 VITALS — DIASTOLIC BLOOD PRESSURE: 78 MMHG | SYSTOLIC BLOOD PRESSURE: 145 MMHG | TEMPERATURE: 99 F | HEART RATE: 56 BPM

## 2019-01-10 DIAGNOSIS — J18.9 COMMUNITY ACQUIRED PNEUMONIA OF RIGHT LUNG, UNSPECIFIED PART OF LUNG: Primary | ICD-10-CM

## 2019-01-10 DIAGNOSIS — J18.9 COMMUNITY ACQUIRED PNEUMONIA OF RIGHT LUNG, UNSPECIFIED PART OF LUNG: ICD-10-CM

## 2019-01-10 DIAGNOSIS — I50.9 ACUTE ON CHRONIC CONGESTIVE HEART FAILURE, UNSPECIFIED HEART FAILURE TYPE (HCC): ICD-10-CM

## 2019-01-10 PROCEDURE — 71046 X-RAY EXAM CHEST 2 VIEWS: CPT | Performed by: FAMILY MEDICINE

## 2019-01-10 PROCEDURE — 99214 OFFICE O/P EST MOD 30 MIN: CPT | Performed by: FAMILY MEDICINE

## 2019-01-10 PROCEDURE — G0463 HOSPITAL OUTPT CLINIC VISIT: HCPCS | Performed by: FAMILY MEDICINE

## 2019-01-10 RX ORDER — FUROSEMIDE 20 MG/1
20 TABLET ORAL 2 TIMES DAILY
Qty: 10 TABLET | Refills: 0 | Status: SHIPPED | OUTPATIENT
Start: 2019-01-10 | End: 2019-03-13

## 2019-01-10 RX ORDER — FUROSEMIDE 20 MG/1
TABLET ORAL
Qty: 180 TABLET | Refills: 0 | OUTPATIENT
Start: 2019-01-10

## 2019-01-10 RX ORDER — QUETIAPINE FUMARATE 50 MG/1
TABLET, EXTENDED RELEASE ORAL
Refills: 0 | COMMUNITY
Start: 2018-11-26 | End: 2019-02-21

## 2019-01-10 NOTE — PROGRESS NOTES
1/10/2019 3:02 PM    Brigitte Jolly, : 1929  Patient presents with:   Follow - Up  Pneumonia: still has some coughing   Eval-P (psychiatric): states that over the weekend hallucinations were bad Dr Enmanuel Noble increased her seroquel    HPI: heart failure (CHF) (HCC)    • Gastroesophageal reflux disease    • Hypertension    • Osteoarthritis    • Osteoporosis    • Parkinson's disease ([facility] [address]    • Varicose veins        Past Surgical History:   Past Surgical History:   Procedure Laterality Date   • 0   Albuterol Sulfate  (90 Base) MCG/ACT Inhalation Aero Soln Inhale 2 puffs into the lungs every 6 (six) hours as needed for Wheezing.  Disp: 1 Inhaler Rfl: 3   DILTIAZEM HCL 30 MG Oral Tab TAKE 1 TABLET(30 MG) BY MOUTH THREE TIMES DAILY Disp: 270 t TM  bilateral: normal  NOSE: nasal turbinates: pink, normal mucosa  THROAT: clear, without exudates  LUNGS: rales: basilar and rhonchi: basilar  ABDOMINAL: Soft NABS, ND, NT    Labs performed this visit:  No results found for this or any previous visit (fr MEALS, Disp: 180 tablet, Rfl: 2  •  ZOSTAVAX 80681 UNT/0.65ML Subcutaneous Recon Soln, , Disp: , Rfl:   •  Acetaminophen (ACETAMIN OR), Take by mouth., Disp: , Rfl:   •  aspirin 81 MG Oral Tab EC, 1 tablet po Q Day, Disp: , Rfl: .    LABORATORY & ORDERS: CX

## 2019-02-08 RX ORDER — ERGOCALCIFEROL 1.25 MG/1
CAPSULE ORAL
Qty: 13 CAPSULE | Refills: 1 | Status: SHIPPED | OUTPATIENT
Start: 2019-02-08 | End: 2019-07-22

## 2019-02-21 ENCOUNTER — OFFICE VISIT (OUTPATIENT)
Dept: FAMILY MEDICINE CLINIC | Facility: CLINIC | Age: 84
End: 2019-02-21
Payer: MEDICARE

## 2019-02-21 VITALS
RESPIRATION RATE: 16 BRPM | SYSTOLIC BLOOD PRESSURE: 134 MMHG | HEART RATE: 68 BPM | DIASTOLIC BLOOD PRESSURE: 78 MMHG | TEMPERATURE: 97 F

## 2019-02-21 DIAGNOSIS — N18.1 CRI (CHRONIC RENAL INSUFFICIENCY), STAGE 1: ICD-10-CM

## 2019-02-21 DIAGNOSIS — I48.20 CHRONIC ATRIAL FIBRILLATION (HCC): ICD-10-CM

## 2019-02-21 DIAGNOSIS — I50.9 ACUTE ON CHRONIC CONGESTIVE HEART FAILURE, UNSPECIFIED HEART FAILURE TYPE (HCC): Primary | ICD-10-CM

## 2019-02-21 DIAGNOSIS — R30.0 DYSURIA: ICD-10-CM

## 2019-02-21 LAB
APPEARANCE: CLEAR
BILIRUBIN: NEGATIVE
GLUCOSE (URINE DIPSTICK): NEGATIVE MG/DL
KETONES (URINE DIPSTICK): NEGATIVE MG/DL
MULTISTIX LOT#: NORMAL NUMERIC
NITRITE, URINE: NEGATIVE
PH, URINE: 6 (ref 4.5–8)
PROTEIN (URINE DIPSTICK): NEGATIVE MG/DL
SPECIFIC GRAVITY: 1.03 (ref 1–1.03)
URINE-COLOR: YELLOW
UROBILINOGEN,SEMI-QN: 0.2 MG/DL (ref 0–1.9)

## 2019-02-21 PROCEDURE — 81003 URINALYSIS AUTO W/O SCOPE: CPT | Performed by: FAMILY MEDICINE

## 2019-02-21 PROCEDURE — G0463 HOSPITAL OUTPT CLINIC VISIT: HCPCS | Performed by: FAMILY MEDICINE

## 2019-02-21 PROCEDURE — 99213 OFFICE O/P EST LOW 20 MIN: CPT | Performed by: FAMILY MEDICINE

## 2019-02-21 RX ORDER — QUETIAPINE FUMARATE 50 MG/1
TABLET, EXTENDED RELEASE ORAL
Qty: 60 TABLET | Refills: 0 | Status: SHIPPED | OUTPATIENT
Start: 2019-02-21 | End: 2019-05-31 | Stop reason: DRUGHIGH

## 2019-02-21 RX ORDER — CARVEDILOL 12.5 MG/1
TABLET ORAL
Qty: 180 TABLET | Refills: 2 | Status: SHIPPED | OUTPATIENT
Start: 2019-02-21 | End: 2019-11-30

## 2019-02-21 NOTE — PROGRESS NOTES
2/21/2019  2:55 PM    Gwendolyn Mendieta is a 80year old female. Chief complaint(s): Patient presents with: Follow - Up    HPI:     Gwendolyn Mendieta primary complaint is regarding CAD/CHF/A fib/CRI.      Patient to be evaluated for atrial fibrillation, HTN High Dose 65 YRS & Older PRSV Free (04841)                          09/19/2011 03/07/2013 08/28/2014 09/26/2015      FLUAD High Dose 72 yr and older (24319)                          09/21/2017 11/16/2018      HIGH DOSE FLU 65 Oral Tab EC 1 tablet po Q Day Disp:  Rfl:        Allergies:  No Known Allergies      ROS:   Review of Systems   Constitutional: Negative for chills, fatigue and fever. HENT: Negative for ear pain and sore throat.     Respiratory: Negative for cough and wh Negative    Leukocyte Esterase Urine TRACE Negative    APPEARANCE CLEAR Clear    Color Urine YELLOW Yellow    Multistix Lot# [id number] Numeric    Multistix Expiration Date 05-31-19 Date       EKG / Spirometry : -     Radiology: No results found.      ASSESSME tablet, Rfl: 1  •  ZOSTAVAX 13355 UNT/0.65ML Subcutaneous Recon Soln, , Disp: , Rfl:   •  Acetaminophen (ACETAMIN OR), Take by mouth., Disp: , Rfl:   •  aspirin 81 MG Oral Tab EC, 1 tablet po Q Day, Disp: , Rfl:          LABORATORY & ORDERS: Orders Placed MOUTH TWICE DAILY WITH MEALS   • dilTIAZem HCl 30 MG Oral Tab 270 tablet 1     Sig: TAKE 1 TABLET(30 MG) BY MOUTH THREE TIMES DAILY       Imaging & Referrals:  None         Joanne Herrera MD

## 2019-02-22 RX ORDER — QUETIAPINE FUMARATE 50 MG/1
TABLET, EXTENDED RELEASE ORAL
Qty: 180 TABLET | Refills: 0 | OUTPATIENT
Start: 2019-02-22

## 2019-03-13 ENCOUNTER — NURSE TRIAGE (OUTPATIENT)
Dept: OTHER | Age: 84
End: 2019-03-13

## 2019-03-13 ENCOUNTER — OFFICE VISIT (OUTPATIENT)
Dept: FAMILY MEDICINE CLINIC | Facility: CLINIC | Age: 84
End: 2019-03-13
Payer: MEDICARE

## 2019-03-13 VITALS
RESPIRATION RATE: 16 BRPM | DIASTOLIC BLOOD PRESSURE: 73 MMHG | TEMPERATURE: 99 F | HEART RATE: 64 BPM | SYSTOLIC BLOOD PRESSURE: 144 MMHG

## 2019-03-13 DIAGNOSIS — J02.9 SORE THROAT: Primary | ICD-10-CM

## 2019-03-13 LAB
CONTROL LINE PRESENT WITH A CLEAR BACKGROUND (YES/NO): YES YES/NO
KIT LOT #: NORMAL NUMERIC
STREP GRP A CUL-SCR: NEGATIVE

## 2019-03-13 PROCEDURE — 99213 OFFICE O/P EST LOW 20 MIN: CPT | Performed by: FAMILY MEDICINE

## 2019-03-13 PROCEDURE — G0463 HOSPITAL OUTPT CLINIC VISIT: HCPCS | Performed by: FAMILY MEDICINE

## 2019-03-13 PROCEDURE — 87880 STREP A ASSAY W/OPTIC: CPT | Performed by: FAMILY MEDICINE

## 2019-03-13 NOTE — PROGRESS NOTES
3/13/2019 11:10 AM    Neli Farnsworth, : 1929  Patient presents with:  Sore Throat: x 2 days, sore throat, irritation, horseness voice, wheezing,     HPI:     Neli Farnsworth is a 80year old female who presents for evaluation of a chief complaint Osteoporosis    • Parkinson's disease ([facility] [address]    • Varicose veins        Past Surgical History:   Past Surgical History:   Procedure Laterality Date   • APPENDECTOMY         Social History: Social History    Socioeconomic History      Marital status:   Dose 65 yr and older (90796)                          09/21/2017 11/16/2018      HIGH DOSE FLU 65 YRS AND OLDER PRSV FREE SINGLE D (05321) FLU CLINIC                          12/01/2016      Influenza             10/13/2009      Pneumovax 23 09/2 sounds  HEAD: normocephalic, atraumatic  EYES: sclera non icteric bilateral, conjunctiva clear  EARS: TM  bilateral: normal  NOSE: nasal turbinates: pink, normal mucosa  THROAT: clear, without exudates  LUNGS: clear to auscultation bilaterally; no rales, r Placed This Encounter      POC Rapid Strep [id number]    RECOMMENDATIONS given include: Please, call our office with any questions or concerns. Notify the doctor if there is a deterioration or worsening of the medical condition.  Also, inform the doctor with a

## 2019-03-13 NOTE — TELEPHONE ENCOUNTER
Action Requested: Summary for Provider     []  Critical Lab, Recommendations Needed  [] Need Additional Advice  [x]   FYI    []   Need Orders  [] Need Medications Sent to Pharmacy  []  Other     SUMMARY: With Polish interpretor, daughter Jocelyn called in

## 2019-04-25 ENCOUNTER — LAB ENCOUNTER (OUTPATIENT)
Dept: LAB | Age: 84
End: 2019-04-25
Attending: FAMILY MEDICINE
Payer: MEDICARE

## 2019-04-25 DIAGNOSIS — N18.1 CRI (CHRONIC RENAL INSUFFICIENCY), STAGE 1: ICD-10-CM

## 2019-04-25 PROCEDURE — 85025 COMPLETE CBC W/AUTO DIFF WBC: CPT

## 2019-04-25 PROCEDURE — 80053 COMPREHEN METABOLIC PANEL: CPT

## 2019-04-25 PROCEDURE — 36415 COLL VENOUS BLD VENIPUNCTURE: CPT

## 2019-04-25 NOTE — PROGRESS NOTES
4/25/2019  3:41 PM    Afsaneh Charles is a 80year old female.     Chief complaint(s): Patient presents with:  Sleep Problem: f/u  Medication Problem: Queteapin dosage was increased 50mg more about 3wks equivalent to 150mg    HPI:     Rachael Shen Smokeless tobacco: Never Used    Alcohol use: No      Alcohol/week: 0.0 oz      Comment: Does not drink alcohol and never has.     Drug use: No       Immunizations:     Immunization History  Administered            Date(s) Administered    FLU VACC High Dose Rfl:    aspirin 81 MG Oral Tab EC 1 tablet po Q Day Disp:  Rfl:        Allergies:  No Known Allergies      ROS:   Review of Systems   Constitutional: Negative for chills, fatigue and fever. HENT: Negative for ear pain and sore throat.     Respiratory: Neg Clear    Color Urine YELLOW Yellow    Multistix Lot# [id number] Numeric    Multistix Expiration Date 08/31/2019 Date       EKG / Spirometry : -     Radiology: No results found.      ASSESSMENT/PLAN:   Assessment   Primary insomnia  (primary encounter diagnosis follow-up visit in 6 weeks/ prn.        3. Dysuria            LABORATORY & ORDERS: Orders Placed This Encounter      POC Urinalysis, Automated Dip without microscopy (PCA and EMMG ONLY) [14666]      Urine Culture, Routine [E]       RECOMMENDATIONS given in

## 2019-04-26 ENCOUNTER — TELEPHONE (OUTPATIENT)
Dept: FAMILY MEDICINE CLINIC | Facility: CLINIC | Age: 84
End: 2019-04-26

## 2019-04-29 RX ORDER — TEMAZEPAM 30 MG/1
30 CAPSULE ORAL NIGHTLY PRN
Qty: 90 CAPSULE | Refills: 1 | Status: SHIPPED | OUTPATIENT
Start: 2019-04-29 | End: 2019-10-10

## 2019-04-30 NOTE — TELEPHONE ENCOUNTER
Spoke with Junaid Templeton from [facility] [phone] and called in the rx for temazepam 30mg. This nurse spoke with the patient's daughter who is on HIPAA, named Jocelyn, and made her aware of the medication change. Daughter voiced understanding.

## 2019-05-30 ENCOUNTER — TELEPHONE (OUTPATIENT)
Dept: FAMILY MEDICINE CLINIC | Facility: CLINIC | Age: 84
End: 2019-05-30

## 2019-05-30 NOTE — TELEPHONE ENCOUNTER
Patient's daughter came in to leave message for doctor. Per daughter TEMAZEPAM 30MG CAPS is not helping patient. Stated that when patient has taken medication it's weakens her but doesn't help her to sleep. Patient is somewhat knocked out but still awake.

## 2019-05-31 RX ORDER — QUETIAPINE FUMARATE 50 MG/1
100 TABLET, EXTENDED RELEASE ORAL NIGHTLY
Qty: 180 TABLET | Refills: 0 | Status: SHIPPED
Start: 2019-05-31

## 2019-05-31 NOTE — TELEPHONE ENCOUNTER
Patient currently taking QUEtiapine Fumarate ER 50 MG Oral Tablet 24 Hr 2 tabs at noon. Do you want her to take 2 tabs at night instead of at noon?      Please advise

## 2019-05-31 NOTE — TELEPHONE ENCOUNTER
Dr Shemar Price (on behalf of Dr Cast)=see below and advise.     With language Line #[phone], spoke with daughter Jocelyn (ERICK),advised to take Seroquel at nighttime per Dr Haile Marie, states that Temazepam 30 mg and Seroquel 100 mg  does not really help at al

## 2019-06-01 NOTE — TELEPHONE ENCOUNTER
After reviewing last progress note I noticed that she was referred to psychiatrist. She should contact pstchiatrist

## 2019-06-03 NOTE — TELEPHONE ENCOUNTER
Clarification on messages sent: Pts' daughter stated the medication prescribed by psychiatric is working. The problem is with the temazepam 30 mg prescribed by you which could be to strong.  It makes pt very tired, mentally foggy and doesn't work well for s

## 2019-07-22 ENCOUNTER — OFFICE VISIT (OUTPATIENT)
Dept: FAMILY MEDICINE CLINIC | Facility: CLINIC | Age: 84
End: 2019-07-22
Payer: MEDICARE

## 2019-07-22 VITALS — SYSTOLIC BLOOD PRESSURE: 104 MMHG | HEART RATE: 75 BPM | TEMPERATURE: 97 F | DIASTOLIC BLOOD PRESSURE: 76 MMHG

## 2019-07-22 DIAGNOSIS — I48.20 CHRONIC ATRIAL FIBRILLATION (HCC): Primary | ICD-10-CM

## 2019-07-22 DIAGNOSIS — B35.1 ONYCHOMYCOSIS: ICD-10-CM

## 2019-07-22 DIAGNOSIS — N18.1 CRI (CHRONIC RENAL INSUFFICIENCY), STAGE 1: ICD-10-CM

## 2019-07-22 DIAGNOSIS — I10 ESSENTIAL HYPERTENSION: ICD-10-CM

## 2019-07-22 PROCEDURE — G0463 HOSPITAL OUTPT CLINIC VISIT: HCPCS | Performed by: FAMILY MEDICINE

## 2019-07-22 PROCEDURE — 99214 OFFICE O/P EST MOD 30 MIN: CPT | Performed by: FAMILY MEDICINE

## 2019-07-22 RX ORDER — FLUCONAZOLE 200 MG/1
200 TABLET ORAL WEEKLY
Qty: 12 TABLET | Refills: 2 | Status: SHIPPED | OUTPATIENT
Start: 2019-07-22 | End: 2019-08-21

## 2019-07-22 RX ORDER — DONEPEZIL HYDROCHLORIDE 5 MG/1
5 TABLET, ORALLY DISINTEGRATING ORAL NIGHTLY
Qty: 30 TABLET | Refills: 1 | Status: SHIPPED | OUTPATIENT
Start: 2019-07-22 | End: 2019-07-23

## 2019-07-22 RX ORDER — ERGOCALCIFEROL 1.25 MG/1
CAPSULE ORAL
Qty: 13 CAPSULE | Refills: 1 | Status: SHIPPED | OUTPATIENT
Start: 2019-07-22 | End: 2020-02-24

## 2019-07-22 NOTE — PROGRESS NOTES
7/22/2019  1:12 PM    Afsaneh Charles is a 80year old female. Chief complaint(s): Patient presents with:  Medication Follow-Up  A fib, HTN, CRI, nail fungus, insomnia    HPI:     Afsaneh Charles primary complaint is regarding as above.      Patient to b treament. Symptoms associated with the infection include: Positive for loss of the nail, Positive for peeling of the nail and splitting of the nail. Risk factors for infection include local trauma.   Presently taking antifungal medication,  Diflucan start Disp: 13 capsule Rfl: 1   Donepezil HCl 5 MG Oral Tablet Dispersible Take 1 tablet (5 mg total) by mouth nightly. Disp: 30 tablet Rfl: 1   Doxylamine Succinate, Sleep, (SLEEP AID) 25 MG Oral Tab Take 25 mg by mouth nightly as needed for Sleep.  Disp:  Rfl: Temp 97.3 °F (36.3 °C)    HENT:   Head: Normocephalic. Mouth/Throat: Oropharynx is clear and moist.   Eyes: Conjunctivae are normal.   Neck: Neck supple. Cardiovascular: Normal rate and regular rhythm. Murmur heard.    Systolic murmur is present with Absolute 0.98 0.10 - 1.00 x10(3) uL    Eosinophil Absolute 0.17 0.00 - 0.70 x10(3) uL    Basophil Absolute 0.04 0.00 - 0.20 x10(3) uL    Immature Granulocyte Absolute 0.02 0.00 - 1.00 x10(3) uL    Neutrophil % 62.5 %    Lymphocyte % 20.5 %    Monocyte % 13 PARoxetine HCl 30 MG Oral Tab, TK 1 T PO QD, Disp: 90 tablet, Rfl: 1  •  Acetaminophen (ACETAMIN OR), Take by mouth., Disp: , Rfl:   •  aspirin 81 MG Oral Tab EC, 1 tablet po Q Day, Disp: , Rfl:      RECOMMENDATIONS given include: Please, call our office w mouth once a week. Indigo Kearns LABORATORY & ORDERS: No orders of the defined types were placed in this encounter.       RECOMMENDATIONS given include: keep affected area clean and dry, especially between the toes, watch for signs of infection such as redness, s

## 2019-07-23 RX ORDER — DONEPEZIL HYDROCHLORIDE 5 MG/1
TABLET, ORALLY DISINTEGRATING ORAL
Qty: 90 TABLET | Refills: 1 | Status: SHIPPED | OUTPATIENT
Start: 2019-07-23 | End: 2019-10-10

## 2019-08-25 RX ORDER — DONEPEZIL HYDROCHLORIDE 5 MG/1
TABLET, ORALLY DISINTEGRATING ORAL
Qty: 90 TABLET | Refills: 1 | OUTPATIENT
Start: 2019-08-25

## 2019-08-26 NOTE — TELEPHONE ENCOUNTER
Duplicate request, previously addressed. rx LR-07/23/19 x 6mo supply and sent to [facility]. Duplicate denied.       Requested Prescriptions   Pending Prescriptions Disp Refills   • DONEPEZIL HCL 5 MG Oral Tablet Dispersible [Pharmacy Med Name: DONE

## 2019-10-01 NOTE — TELEPHONE ENCOUNTER
Pt's pharmacy called in to advise that the following medication is not covered:  Pt's pharmacy is suggesting either Temazepam or Trazadone.   Please advise         Current Outpatient Medications:   •  Suvorexant (BELSOMRA) 10 MG Oral Tab, Take 10 mg by mout No complaints No complaints No complaints No complaints

## 2019-10-10 ENCOUNTER — APPOINTMENT (OUTPATIENT)
Dept: LAB | Age: 84
End: 2019-10-10
Attending: FAMILY MEDICINE
Payer: MEDICARE

## 2019-10-10 ENCOUNTER — OFFICE VISIT (OUTPATIENT)
Dept: FAMILY MEDICINE CLINIC | Facility: CLINIC | Age: 84
End: 2019-10-10
Payer: MEDICARE

## 2019-10-10 VITALS — SYSTOLIC BLOOD PRESSURE: 113 MMHG | HEART RATE: 59 BPM | DIASTOLIC BLOOD PRESSURE: 64 MMHG | TEMPERATURE: 98 F

## 2019-10-10 DIAGNOSIS — N18.1 CRI (CHRONIC RENAL INSUFFICIENCY), STAGE 1: ICD-10-CM

## 2019-10-10 DIAGNOSIS — F51.01 PRIMARY INSOMNIA: ICD-10-CM

## 2019-10-10 DIAGNOSIS — I48.20 CHRONIC ATRIAL FIBRILLATION (HCC): Primary | ICD-10-CM

## 2019-10-10 DIAGNOSIS — B35.1 ONYCHOMYCOSIS: ICD-10-CM

## 2019-10-10 PROCEDURE — G0463 HOSPITAL OUTPT CLINIC VISIT: HCPCS | Performed by: FAMILY MEDICINE

## 2019-10-10 PROCEDURE — 80053 COMPREHEN METABOLIC PANEL: CPT

## 2019-10-10 PROCEDURE — 90662 IIV NO PRSV INCREASED AG IM: CPT | Performed by: FAMILY MEDICINE

## 2019-10-10 PROCEDURE — 36415 COLL VENOUS BLD VENIPUNCTURE: CPT

## 2019-10-10 PROCEDURE — 99213 OFFICE O/P EST LOW 20 MIN: CPT | Performed by: FAMILY MEDICINE

## 2019-10-10 PROCEDURE — G0008 ADMIN INFLUENZA VIRUS VAC: HCPCS | Performed by: FAMILY MEDICINE

## 2019-10-10 RX ORDER — DIPHENHYDRAMINE HCL 25 MG
25 CAPSULE ORAL
COMMUNITY
End: 2019-10-10

## 2019-10-10 RX ORDER — FLUCONAZOLE 200 MG/1
200 TABLET ORAL WEEKLY
COMMUNITY
End: 2019-10-22

## 2019-10-10 RX ORDER — FLUCONAZOLE 200 MG/1
200 TABLET ORAL WEEKLY
Qty: 12 TABLET | Refills: 2 | Status: SHIPPED | OUTPATIENT
Start: 2019-10-10 | End: 2019-11-09

## 2019-10-10 RX ORDER — HYDROXYZINE HYDROCHLORIDE 25 MG/1
TABLET, FILM COATED ORAL
Refills: 2 | COMMUNITY
Start: 2019-09-21

## 2019-10-10 NOTE — PROGRESS NOTES
10/10/2019  12:18 PM    Emanuel Brown is a 80year old female. Chief complaint(s): Patient presents with:   Insomnia: had a bad night, insomnia,talking without making sense   HTN/ CRI  Finger nail fungus  HPI:     Emanuel Brown primary complaint is r treament. Symptoms associated with the infection include: Positive for loss of the nail, Positive for peeling of the nail and splitting of the nail. Risk factors for infection include local trauma.   Presently taking antifungal medication,  Diflucan start prior to today's visit):    Current Outpatient Medications:  hydrOXYzine HCl 25 MG Oral Tab TK 1 T PO QD HS Disp:  Rfl: 2   fluconazole 200 MG Oral Tab Take 200 mg by mouth once a week.  Disp:  Rfl:    fluconazole (DIFLUCAN) 200 MG Oral Tab Take 1 tablet (2 Conjunctivae are normal.   Neck: Neck supple. Cardiovascular: Normal rate. Pulmonary/Chest: Effort normal.   Skin: No rash noted. Right hand finger nail fungus 40% nail involvement    Psychiatric: She has a normal mood and affect.    sleepy       LAB 13.7 %    Eosinophil % 2.4 %    Basophil % 0.6 %    Immature Granulocyte % 0.3 %       EKG / Spirometry : -     Radiology: No results found.      ASSESSMENT/PLAN:   Assessment   Chronic atrial fibrillation  (primary encounter diagnosis)  Cri (chronic renal Visit:  Orders Placed This Encounter      Comp Metabolic Panel (14) [E]      Influenza, High-Dose (Fluzone) [37790]      Meds This Visit:  Requested Prescriptions     Signed Prescriptions Disp Refills   • fluconazole (DIFLUCAN) 200 MG Oral Tab 12 tablet 2

## 2019-10-18 ENCOUNTER — OFFICE VISIT (OUTPATIENT)
Dept: FAMILY MEDICINE CLINIC | Facility: CLINIC | Age: 84
End: 2019-10-18
Payer: MEDICARE

## 2019-10-18 ENCOUNTER — NURSE TRIAGE (OUTPATIENT)
Dept: OTHER | Age: 84
End: 2019-10-18

## 2019-10-18 ENCOUNTER — TELEPHONE (OUTPATIENT)
Dept: FAMILY MEDICINE CLINIC | Facility: CLINIC | Age: 84
End: 2019-10-18

## 2019-10-18 DIAGNOSIS — F02.81 LATE ONSET ALZHEIMER'S DISEASE WITH BEHAVIORAL DISTURBANCE (HCC): ICD-10-CM

## 2019-10-18 DIAGNOSIS — N30.90 CYSTITIS: Primary | ICD-10-CM

## 2019-10-18 DIAGNOSIS — G30.1 LATE ONSET ALZHEIMER'S DISEASE WITH BEHAVIORAL DISTURBANCE (HCC): ICD-10-CM

## 2019-10-18 DIAGNOSIS — R53.1 WEAKNESS: ICD-10-CM

## 2019-10-18 PROCEDURE — G0463 HOSPITAL OUTPT CLINIC VISIT: HCPCS | Performed by: NURSE PRACTITIONER

## 2019-10-18 PROCEDURE — 81003 URINALYSIS AUTO W/O SCOPE: CPT | Performed by: NURSE PRACTITIONER

## 2019-10-18 PROCEDURE — 99213 OFFICE O/P EST LOW 20 MIN: CPT | Performed by: NURSE PRACTITIONER

## 2019-10-18 RX ORDER — SULFAMETHOXAZOLE AND TRIMETHOPRIM 800; 160 MG/1; MG/1
1 TABLET ORAL 2 TIMES DAILY
Qty: 14 TABLET | Refills: 0 | Status: ON HOLD | OUTPATIENT
Start: 2019-10-18 | End: 2019-10-27

## 2019-10-18 NOTE — PATIENT INSTRUCTIONS
Infección De La Vejiga,Concha [Bladder Infection: Female, Adult]    Estephania infección de la vejiga (“cistitis” [cystitis - UTI]) suele provocar constantes deseos de orinar y ardor al orinar.  Es posible que la [facility] se arvind turbia u Johnnie, o que [address] · Si está tomando píldoras anticonceptivas y tiene frecuentes infecciones de vejiga, háblelo con heller médico.  Visita De Control  Visite a heller médico si no marquez desparecido todos los síntomas después de cristal días de [place].   Busque Prontamente Atención Mé

## 2019-10-18 NOTE — PROGRESS NOTES
HPI  Pt presents with daughters for increase in weakness. Does not want to get up or move since yesterday. Speech is garbled at times.   Usually she is able to go to bathroom w assistance and use bedside commode independently-last 2 night she has been unabl Financial resource strain: Not on file      Food insecurity:        Worry: Not on file        Inability: Not on file      Transportation needs:        Medical: Not on file        Non-medical: Not on file    Tobacco Use      Smoking status: Never Smoker Disp: 180 tablet, Rfl: 0  carvedilol 12.5 MG Oral Tab, TAKE 1 TABLET(12.5 MG) BY MOUTH TWICE DAILY WITH MEALS, Disp: 180 tablet, Rfl: 2  MEMANTINE HCL 10 MG Oral Tab, TAKE 1 TABLET(10 MG) BY MOUTH TWICE DAILY, Disp: 180 tablet, Rfl: 0  Blood Pressure Does Pt to call with questions or concerns. Encouraged to sign up for My Chart if not already registered.

## 2019-10-18 NOTE — TELEPHONE ENCOUNTER
Per daughter patient appears ill and is concerned. She states patient cannot walk and has trouble sleeping that could be causing her not to speak well. She states this has happened before and it turned out patient had a UTI. She states Dr advised when this happens to let him know so he can have her urinalysis tested. She states she bought a over the counter urine test and it came back positive for infection. Please advise. Thank you.

## 2019-10-18 NOTE — TELEPHONE ENCOUNTER
Action Requested: Summary for Provider     []  Critical Lab, Recommendations Needed  [] Need Additional Advice  [x]   FYI    []   Need Orders  [] Need Medications Sent to Pharmacy  []  Other     SUMMARY: Daughter calling with complaint of patient having in

## 2019-10-20 PROBLEM — R53.1 WEAKNESS: Status: ACTIVE | Noted: 2019-10-20

## 2019-10-20 PROBLEM — G30.1 LATE ONSET ALZHEIMER'S DISEASE WITH BEHAVIORAL DISTURBANCE (HCC): Status: ACTIVE | Noted: 2019-10-20

## 2019-10-20 PROBLEM — F02.81 LATE ONSET ALZHEIMER'S DISEASE WITH BEHAVIORAL DISTURBANCE (HCC): Status: ACTIVE | Noted: 2019-10-20

## 2019-10-20 PROBLEM — F02.818 LATE ONSET ALZHEIMER'S DISEASE WITH BEHAVIORAL DISTURBANCE (HCC): Status: ACTIVE | Noted: 2019-10-20

## 2019-10-20 NOTE — ASSESSMENT & PLAN NOTE
Urine dip and c/s  Will treat with bactrim ds I po bid due to age. Discussed that uti's can cause weakness and behavior changes in the elderly    Please call if symptoms worsen or are not resolving.

## 2019-10-22 ENCOUNTER — OFFICE VISIT (OUTPATIENT)
Dept: FAMILY MEDICINE CLINIC | Facility: CLINIC | Age: 84
End: 2019-10-22
Payer: MEDICARE

## 2019-10-22 VITALS — TEMPERATURE: 99 F

## 2019-10-22 DIAGNOSIS — N30.00 ACUTE CYSTITIS WITHOUT HEMATURIA: Primary | ICD-10-CM

## 2019-10-22 DIAGNOSIS — R53.1 WEAKNESS: ICD-10-CM

## 2019-10-22 DIAGNOSIS — F51.01 PRIMARY INSOMNIA: ICD-10-CM

## 2019-10-22 DIAGNOSIS — G30.1 LATE ONSET ALZHEIMER'S DISEASE WITH BEHAVIORAL DISTURBANCE (HCC): ICD-10-CM

## 2019-10-22 DIAGNOSIS — F02.81 LATE ONSET ALZHEIMER'S DISEASE WITH BEHAVIORAL DISTURBANCE (HCC): ICD-10-CM

## 2019-10-22 PROCEDURE — 99213 OFFICE O/P EST LOW 20 MIN: CPT | Performed by: FAMILY MEDICINE

## 2019-10-22 PROCEDURE — G0463 HOSPITAL OUTPT CLINIC VISIT: HCPCS | Performed by: FAMILY MEDICINE

## 2019-10-22 NOTE — PROGRESS NOTES
10/22/2019  2:08 PM    Segun Gonzalez is a 80year old female.     Chief complaint(s): Patient presents with:  UTI: f/u, was seen by Marcio Monge, unable to walk, talking w/o making sense   Bleeding/Bruising: legs and arms     HPI:     Segun Gonzalez primary com History  Administered            Date(s) Administered    FLU VACC High Dose 65 YRS & Older PRSV Free (36331)                          09/19/2011  03/07/2013  08/28/2014                            09/26/2015  10/10/2019      FLUAD High Dose 72 yr and older fatigue and fever. Respiratory: Negative for shortness of breath. Cardiovascular: Negative for chest pain and palpitations. Musculoskeletal: Negative for back pain. Neurological: Positive for weakness. Negative for headaches.    Psychiatric/Behavio Sensitive      Trimethoprim/Sulfa <=20 Sensitive      EKG / Spirometry : -     Radiology: No results found.      ASSESSMENT/PLAN:   Assessment   Acute cystitis without hematuria  (primary encounter diagnosis)  Weakness  Late onset alzheimer's disease with b the ER id she develops any new neurological deficits; slurred speech, difficult swallowing, one side weakness, etc.  DNR order obtained today. FOLLOW-UP: Schedule a follow-up visit in 1 week /prn.        Orders This Visit:  No orders of the defined types

## 2019-10-25 ENCOUNTER — APPOINTMENT (OUTPATIENT)
Dept: CT IMAGING | Facility: HOSPITAL | Age: 84
DRG: 884 | End: 2019-10-25
Attending: EMERGENCY MEDICINE
Payer: MEDICARE

## 2019-10-25 ENCOUNTER — APPOINTMENT (OUTPATIENT)
Dept: GENERAL RADIOLOGY | Facility: HOSPITAL | Age: 84
DRG: 884 | End: 2019-10-25
Attending: EMERGENCY MEDICINE
Payer: MEDICARE

## 2019-10-25 ENCOUNTER — HOSPITAL ENCOUNTER (INPATIENT)
Facility: HOSPITAL | Age: 84
LOS: 3 days | Discharge: HOSPICE | DRG: 884 | End: 2019-10-28
Attending: EMERGENCY MEDICINE | Admitting: HOSPITALIST
Payer: MEDICARE

## 2019-10-25 DIAGNOSIS — R53.1 WEAKNESS GENERALIZED: Primary | ICD-10-CM

## 2019-10-25 DIAGNOSIS — E86.0 DEHYDRATION: ICD-10-CM

## 2019-10-25 DIAGNOSIS — N28.9 RENAL INSUFFICIENCY: ICD-10-CM

## 2019-10-25 PROCEDURE — 71045 X-RAY EXAM CHEST 1 VIEW: CPT | Performed by: EMERGENCY MEDICINE

## 2019-10-25 PROCEDURE — 99223 1ST HOSP IP/OBS HIGH 75: CPT | Performed by: HOSPITALIST

## 2019-10-25 PROCEDURE — 70450 CT HEAD/BRAIN W/O DYE: CPT | Performed by: EMERGENCY MEDICINE

## 2019-10-25 RX ORDER — ONDANSETRON 2 MG/ML
4 INJECTION INTRAMUSCULAR; INTRAVENOUS EVERY 6 HOURS PRN
Status: DISCONTINUED | OUTPATIENT
Start: 2019-10-25 | End: 2019-10-28

## 2019-10-25 RX ORDER — SODIUM CHLORIDE 9 MG/ML
INJECTION, SOLUTION INTRAVENOUS CONTINUOUS
Status: DISCONTINUED | OUTPATIENT
Start: 2019-10-25 | End: 2019-10-28

## 2019-10-25 RX ORDER — QUETIAPINE FUMARATE 50 MG/1
100 TABLET, EXTENDED RELEASE ORAL NIGHTLY
Status: DISCONTINUED | OUTPATIENT
Start: 2019-10-25 | End: 2019-10-28

## 2019-10-25 RX ORDER — SODIUM CHLORIDE 0.9 % (FLUSH) 0.9 %
3 SYRINGE (ML) INJECTION AS NEEDED
Status: DISCONTINUED | OUTPATIENT
Start: 2019-10-25 | End: 2019-10-28

## 2019-10-25 RX ORDER — MEMANTINE HYDROCHLORIDE 10 MG/1
10 TABLET ORAL 2 TIMES DAILY
Status: DISCONTINUED | OUTPATIENT
Start: 2019-10-25 | End: 2019-10-28

## 2019-10-25 RX ORDER — CARVEDILOL 12.5 MG/1
12.5 TABLET ORAL 2 TIMES DAILY WITH MEALS
Status: DISCONTINUED | OUTPATIENT
Start: 2019-10-25 | End: 2019-10-28

## 2019-10-25 RX ORDER — ACETAMINOPHEN 325 MG/1
650 TABLET ORAL EVERY 6 HOURS PRN
Status: DISCONTINUED | OUTPATIENT
Start: 2019-10-25 | End: 2019-10-28

## 2019-10-25 RX ORDER — SODIUM CHLORIDE 9 MG/ML
INJECTION, SOLUTION INTRAVENOUS ONCE
Status: COMPLETED | OUTPATIENT
Start: 2019-10-25 | End: 2019-10-25

## 2019-10-25 NOTE — ED PROVIDER NOTES
Patient Seen in: [facility] Emergency Department      History   Patient presents with:  Altered Mental Status (neurologic)  Weakness    Stated Complaint:     HPI    35-year-old female with dementia cared for by family at home with states over the stimuli, follows commands to breathe deeply  Head: Normocephalic and atraumatic. Eyes: Conjunctivae are normal. Pupils are equal, round, and reactive to light. Neck: Normal range of motion. Neck supple. No JVD.   Cardiovascular: Normal rate, regular rh CBC W/ DIFFERENTIAL[[id number]]          Abnormal            Final result                 Please view results for these tests on the individual orders.    RAINBOW DRAW BLUE   RAINBOW DRAW LAVENDER   RAINBOW DRAW LIGHT GREEN   RAINBOW DRAW G chronic small vessel ischemic disease. No edema, hemorrhage, mass, or acute infarction is seen. There is commensurate atrophy. CALVARIUM: There is no apparent depressed fracture, mass, or other significant visible lesion.   SINUSES: Limited views demonstra Admit to the [facility].  Admission disposition: 10/25/2019  2:14 PM                   Disposition and Plan     Clinical Impression:  Weakness generalized  (primary encounter diagnosis)  Dehydration  Renal insufficiency    Disposition:  Admit  10

## 2019-10-25 NOTE — PLAN OF CARE
Patient oriented to unit upon transfer from ED. Patient is Paraguayan speaking only. Daughter and son-in law at bedside. Home medications discussed with patient. Admission documentation completed. Patient presents with generalized weakness.  Has been relying o patient's stated pain goal  Description  INTERVENTIONS:  - Encourage pt to monitor pain and request assistance  - Assess pain using appropriate pain scale  - Administer analgesics based on type and severity of pain and evaluate response  - Implement non-ph

## 2019-10-25 NOTE — H&P
[facility]    PATIENT'S NAME: Paige Kong PHYSICIAN: Mallorie Smith MD   PATIENT ACCOUNT#:   [id number]    LOCATION:  [address] [place] [address]  MEDICAL RECORD #:   [id number]       YOB: 1929  ADMISSION DATE:       10/25/20 living. REVIEW OF SYSTEMS:  Unable to obtain from the patient. According to family, the patient has been deteriorating for the last 4 to 5 days with decreased appetite, more confusion, less responsive. Other 12-point review of systems unobtainable.

## 2019-10-25 NOTE — ED NOTES
Orders for admission, patient is aware of plan and ready to go upstairs. Any questions, please call ED RN Ruben Contreras  at extension [phone]. Pt from home,Maori speaking only. Pt's son in law is translating at bedside. Finished course of bactrim for UTI today.

## 2019-10-25 NOTE — ED INITIAL ASSESSMENT (HPI)
AMS and no sleep x 4 days. Baseline dementia but more confused. Pt also not walking for the past several days. No known fevers.

## 2019-10-26 PROCEDURE — 99223 1ST HOSP IP/OBS HIGH 75: CPT | Performed by: OTHER

## 2019-10-26 PROCEDURE — 99233 SBSQ HOSP IP/OBS HIGH 50: CPT | Performed by: HOSPITALIST

## 2019-10-26 RX ORDER — HEPARIN SODIUM 5000 [USP'U]/ML
5000 INJECTION, SOLUTION INTRAVENOUS; SUBCUTANEOUS EVERY 12 HOURS SCHEDULED
Status: DISCONTINUED | OUTPATIENT
Start: 2019-10-26 | End: 2019-10-28

## 2019-10-26 RX ORDER — DONEPEZIL HYDROCHLORIDE 5 MG/1
5 TABLET, FILM COATED ORAL NIGHTLY
Status: DISCONTINUED | OUTPATIENT
Start: 2019-10-26 | End: 2019-10-28

## 2019-10-26 NOTE — CONSULTS
[facility]    PATIENT'S NAME: Jewels Max   ATTENDING PHYSICIAN: Rolando Gutierrez MD   CONSULTING PHYSICIAN: Robert Tai MD   PATIENT ACCOUNT#:   [id number]    LOCATION:  [address] [place] [address] Dr Lin RECORD #:   [id number]       DATE OF BIRTH: strength. Able to lift both arms and legs up off the bed symmetrically. No Babinski signs. She did not cooperate with sensory or cerebellar testing. No carotid bruits. Pulses symmetric. No nuchal rigidity. LABORATORY DATA:  Labs reviewed.   Sodium

## 2019-10-26 NOTE — PLAN OF CARE
Problem: Patient Centered Care  Goal: Patient preferences are identified and integrated in the patient's plan of care  Description  Interventions:  - What would you like us to know as we care for you? I live at home with my daughter and her family.   - Pr or patient reports new pain  - Anticipate increased pain with activity and pre-medicate as appropriate  Outcome: Progressing   Patient admitted for dehydration, continuous iv fluid and safety maintained.

## 2019-10-26 NOTE — PROGRESS NOTES
[facility]  Hospitalist Progress Note     Gema Mclean Patient Status:  Inpatient    1929  80year old [facility] [id number]   Location [facility] Attending Bailey Gomez MD   Hosp Day # 1 PCP Antonio Rodriguez MD     ASSESSMENT/PLAN Recent Labs   Lab 10/25/19  1307 10/26/19  0635   * 97   BUN 35* 26*   CREATSERUM 1.63* 1.27*   GFRAA 32* 43*   GFRNAA 28* 37*   CA 9.4 8.4*   * 134*   K 5.6* 4.7    105   CO2 25.0 21.0     No results for input(s): PT, INR, PTT in th

## 2019-10-27 ENCOUNTER — APPOINTMENT (OUTPATIENT)
Dept: MRI IMAGING | Facility: HOSPITAL | Age: 84
DRG: 884 | End: 2019-10-27
Attending: Other
Payer: MEDICARE

## 2019-10-27 PROCEDURE — 99232 SBSQ HOSP IP/OBS MODERATE 35: CPT | Performed by: HOSPITALIST

## 2019-10-27 PROCEDURE — 99497 ADVNCD CARE PLAN 30 MIN: CPT | Performed by: HOSPITALIST

## 2019-10-27 PROCEDURE — 70551 MRI BRAIN STEM W/O DYE: CPT | Performed by: OTHER

## 2019-10-27 PROCEDURE — 99233 SBSQ HOSP IP/OBS HIGH 50: CPT | Performed by: OTHER

## 2019-10-27 RX ORDER — DONEPEZIL HYDROCHLORIDE 5 MG/1
5 TABLET, FILM COATED ORAL NIGHTLY
Qty: 30 TABLET | Refills: 0 | Status: SHIPPED | OUTPATIENT
Start: 2019-10-27

## 2019-10-27 NOTE — CM/SW NOTE
NOY spoke with Dr. Judy Kinsey who states pt is requesting Hospice. NOY sent referral to [facility]. MD stated he also spoke with [facility] and they are requesting the order sent through [facility]. NOY sent over info.      NOY/EMILY to remain available

## 2019-10-27 NOTE — PLAN OF CARE
Problem: Patient Centered Care  Goal: Patient preferences are identified and integrated in the patient's plan of care  Description  Interventions:  - What would you like us to know as we care for you? I live at home with my daughter and her family.   - Pr MD/LIP if interventions unsuccessful or patient reports new pain  - Anticipate increased pain with activity and pre-medicate as appropriate  Outcome: Adequate for Discharge   Waiting for hospice evaluation prior to discharge home.

## 2019-10-27 NOTE — PROGRESS NOTES
[facility]  Hospitalist Progress Note     Hydes Monongahela Patient Status:  Inpatient    1929  80year old [facility] [id number]   Location [facility] Attending Tio Ribera MD   Hosp Day # 2 PCP Joanne Herrera MD     ASSESSMENT/PLAN WBC 9.1 9.0   .0 211.0     Recent Labs   Lab 10/25/19  1307 10/26/19  0635   * 97   BUN 35* 26*   CREATSERUM 1.63* 1.27*   GFRAA 32* 43*   GFRNAA 28* 37*   CA 9.4 8.4*   * 134*   K 5.6* 4.7    105   CO2 25.0 21.0     No results

## 2019-10-28 VITALS
DIASTOLIC BLOOD PRESSURE: 91 MMHG | TEMPERATURE: 98 F | OXYGEN SATURATION: 94 % | HEART RATE: 63 BPM | SYSTOLIC BLOOD PRESSURE: 159 MMHG | RESPIRATION RATE: 18 BRPM

## 2019-10-28 PROCEDURE — 99239 HOSP IP/OBS DSCHRG MGMT >30: CPT | Performed by: HOSPITALIST

## 2019-10-28 NOTE — PROGRESS NOTES
[facility]    Progress Note    Guerrero Orozco Patient Status:  Inpatient    1929 MRN [id number]   Location [facility] 5SW/SE Attending Anabell Michael MD   Hosp Day # 2 PCP Augie Monroe MD       Subjective:   Landon Linton headache.     Results:     Lab Results   Component Value Date    WBC 9.0 10/26/2019    HGB 12.2 10/26/2019    HCT 37.6 10/26/2019    .0 10/26/2019    CREATSERUM 1.27 (H) 10/26/2019    BUN 26 (H) 10/26/2019     (L) 10/26/2019    K 4.7 10/26/2019

## 2019-10-28 NOTE — HOSPICE RN NOTE
Met with family at bedside to discuss discharge plan. Family requesting 2pm discharge. Explained that ambulance will be set up and meds will be ordered and delivered later this afternoon. Family agreeable to POC.  Spoke with Dr Joni Gutierrez and Kemar Yi RN about PO

## 2019-10-28 NOTE — DISCHARGE SUMMARY
[place] HOSPITALIST  DISCHARGE SUMMARY     Dallin Nixon Patient Status:  Inpatient    1929 MRN [id number]   [facility] 5SW/SE Attending Alejandra Sabillon MD   Hosp Day # 3 PCP Belén Chatterjee MD     DATE OF ADMISSION: 10/25/2019 home.    Patient understands return to the emergency room for increased pain, fever, discharge, shortness of breath, chest pain, new neurologic symptoms, other concerning symptoms.     PHYSICAL EXAM:  Temp:  [98.4 °F (36.9 °C)-99.1 °F (37.3 °C)] 98.4 °F (36 DAILY   Quantity:  90 tablet  Refills:  2     hydrOXYzine HCl 25 MG Tabs  Commonly known as:  ATARAX      TK 1 T PO QD HS   Refills:  2     Memantine HCl 10 MG Tabs  Commonly known as:  NAMENDA      TAKE 1 TABLET(10 MG) BY MOUTH TWICE DAILY   Quantity:  18

## 2019-11-22 ENCOUNTER — TELEPHONE (OUTPATIENT)
Dept: FAMILY MEDICINE CLINIC | Facility: CLINIC | Age: 84
End: 2019-11-22

## 2019-11-22 NOTE — TELEPHONE ENCOUNTER
Talbert Spatz with [facility] requested call from Dr. Nancy Peña she stated daughter told her that Dr. Nancy Peña would like to speak with nurse    Would like to talk about medications and blood work    Please call as soon as possible

## 2019-11-30 RX ORDER — CARVEDILOL 12.5 MG/1
TABLET ORAL
Qty: 180 TABLET | Refills: 1 | Status: SHIPPED | OUTPATIENT
Start: 2019-11-30 | End: 2020-05-09

## 2019-12-01 NOTE — TELEPHONE ENCOUNTER
Refill passed per [facility] protocol.     Hypertensive Medications  Protocol Criteria:  · Appointment scheduled in the past 6 months or in the next 3 months  · BMP or CMP in the past 12 months  · Creatinine result < 2  Recent Outpatient Visits

## 2020-02-25 RX ORDER — ERGOCALCIFEROL 1.25 MG/1
CAPSULE ORAL
Qty: 13 CAPSULE | Refills: 1 | Status: SHIPPED | OUTPATIENT
Start: 2020-02-25 | End: 2020-07-30

## 2020-02-25 NOTE — TELEPHONE ENCOUNTER
Review pended refill request as it does not fall under a protocol.   Requested Prescriptions     Pending Prescriptions Disp Refills   • ERGOCALCIFEROL 1.25 MG (72941 UT) Oral Cap [Pharmacy Med Name: VITAMIN D2 50,000IU (ERGO) CAP RX] 13 capsule 1     Sig: T

## 2020-05-09 RX ORDER — CARVEDILOL 12.5 MG/1
TABLET ORAL
Qty: 180 TABLET | Refills: 1 | Status: SHIPPED | OUTPATIENT
Start: 2020-05-09

## 2020-07-30 RX ORDER — ERGOCALCIFEROL 1.25 MG/1
CAPSULE ORAL
Qty: 13 CAPSULE | Refills: 1 | Status: SHIPPED | OUTPATIENT
Start: 2020-07-30

## 2020-11-03 ENCOUNTER — NURSE TRIAGE (OUTPATIENT)
Dept: FAMILY MEDICINE CLINIC | Facility: CLINIC | Age: 85
End: 2020-11-03

## 2020-11-03 DIAGNOSIS — R82.81 PYURIA: Primary | ICD-10-CM

## 2020-11-03 NOTE — TELEPHONE ENCOUNTER
Spoke with WENDY Howard verified  She was informed of MD recommendation, she stated understanding. Central scheduling tel # provided.

## 2020-11-03 NOTE — TELEPHONE ENCOUNTER
Action Requested: Summary for Provider     []  Critical Lab, Recommendations Needed  [] Need Additional Advice  []   FYI    []   Need Orders  [x] Need Medications Sent to Pharmacy  []  Other     SUMMARY: Mort[facility] Coke[facility] [address] [place], [place] [address]in

## 2020-11-04 ENCOUNTER — LAB ENCOUNTER (OUTPATIENT)
Dept: LAB | Age: 85
End: 2020-11-04
Attending: FAMILY MEDICINE
Payer: MEDICARE

## 2020-11-04 DIAGNOSIS — R82.81 PYURIA: ICD-10-CM

## 2020-11-04 PROCEDURE — 87186 SC STD MICRODIL/AGAR DIL: CPT

## 2020-11-04 PROCEDURE — 87086 URINE CULTURE/COLONY COUNT: CPT

## 2020-11-04 PROCEDURE — 87077 CULTURE AEROBIC IDENTIFY: CPT

## 2020-11-07 RX ORDER — CIPROFLOXACIN 500 MG/1
500 TABLET, FILM COATED ORAL 2 TIMES DAILY
Qty: 20 TABLET | Refills: 0 | Status: SHIPPED | OUTPATIENT
Start: 2020-11-07 | End: 2020-11-17

## 2021-03-03 DIAGNOSIS — Z23 NEED FOR VACCINATION: ICD-10-CM

## 2021-03-25 ENCOUNTER — PATIENT OUTREACH (OUTPATIENT)
Dept: CASE MANAGEMENT | Age: 86
End: 2021-03-25

## 2021-03-25 NOTE — PROGRESS NOTES
Spoke to patient daughter stated patient is currently in hospice, has been on it for one year and might go back on Palliative care as patient is stable.

## 2022-04-05 NOTE — PROGRESS NOTES
12/4/2018  3:10 PM    Dallin Nixon is a 80year old female. Chief complaint(s): Patient presents with: Follow - Up  Test Results    HPI:     Dallin Nixon primary complaint is regarding multiple issues.      Patient to be evaluated for atrial fibri Comment: Does not drink alcohol and never has.     Drug use: No       Immunizations:     Immunization History  Administered            Date(s) Administered    FLU VACC High Dose 72 YRS & Older PRSV Free (01306)                          09/19/2011 03/07/201 MG Oral Tab EC 1 tablet po Q Day Disp:  Rfl:        Allergies:  No Known Allergies      ROS:   Review of Systems   Constitutional: Negative for chills, fatigue and fever. HENT: Negative for ear pain and sore throat.     Respiratory: Negative for cough and Globulin 3.8 (H) 2.5 - 3.7 g/dL    A/G Ratio 0.9 (L) 1.0 - 2.0    Anion Gap 6 0 - 18 mmol/L    BUN/CREA Ratio 19.5 10.0 - 20.0    Calculated Osmolality 288 275 - 295 mOsm/kg    GFR, Non- 39 (L) >=60    GFR, -American 45 (L) >=60    F 270 tablet, Rfl: 2  •  PARoxetine HCl 30 MG Oral Tab, TK 1 T PO QD, Disp: 90 tablet, Rfl: 1  •  Benazepril HCl 10 MG Oral Tab, Take 1 tablet (10 mg total) by mouth once daily. , Disp: 90 tablet, Rfl: 2  •  carvedilol 12.5 MG Oral Tab, TAKE 1 TABLET(12.5 MG) Ataxic gait

## 2023-04-10 ENCOUNTER — TELEPHONE (OUTPATIENT)
Dept: FAMILY MEDICINE CLINIC | Facility: CLINIC | Age: 88
End: 2023-04-10

## (undated) NOTE — IP AVS SNAPSHOT
[facility]            (For Outpatient Use Only) Initial Admit Date: 10/25/2019   Inpt/Obs Admit Date: Inpt: 10/25/19 / Obs: N/A   Discharge Date:    Tamia Bloom:  [de-identified]   MRN: [de-identified]   CSN: [id number]   CEID: [id number]        E Hospital Account Financial Class: Medicare    October 28, 2019

## (undated) NOTE — MR AVS SNAPSHOT
[address]  [address] [facility]  [phone]               Thank you for choosing us for your health care visit with Amada Knight MD.  We are glad to serve you and happy to provide you with this summ Commonly known as:  FOLVITE           Memantine HCl 10 MG Tabs   Commonly known as:  NAMENDA           Mometasone Furoate 0.1 % Crea   Apply to affected area(s) BID   Commonly known as:  ELOCON           PARoxetine HCl 30 MG Tabs   TK 1 T PO QD   Commonly ? Install grab bars on the bathroom walls beside the tub, shower and toilet. ? Use a non skid rubber mat in the tub/shower. ? If you are unsteady on your feet you may want to use a shower chair/bench and a hand held shower head while bathing/showering. Eat plenty of low-fat dairy products High fat meats and dairy   Choose whole grain products Foods high in sodium   Water is best for hydration Fast food.    Eat at home when possible     Tips for increasing your physical activity – Adults who are physically

## (undated) NOTE — MR AVS SNAPSHOT
[address]  [address] [place] [phone]               Thank you for choosing us for your health care visit with Damion Delaney MD.  We are glad to serve you and happy to provide you with this summ What changed: You were already taking a medication with the same name, and this prescription was added. Make sure you understand how and when to take each.    Commonly known as:  SPECTAZOLE           ergocalciferol 10181 UNITS Caps   TAKE ONE CAPSULE BY MO Referral Information     Referral Order Referred to  Madisyn Maria Phone Visits Status Diagnosis                      Acute cystitis without hematuria [721863]           Acute cystitis without hematuria [276666]                                 Acute cystitis Visit [facility] online at  [email or web address]

## (undated) NOTE — LETTER
September 25, 2017     [address] [address]      Dear Swati Peralta:    Below are the results from your recent visit: results are within normal limits    Resulted Orders   LIPID PANEL   Result Value Ref Range    HDL Cholesterol 50 If you have any questions or concerns, please don't hesitate to call [phone]

## (undated) NOTE — MR AVS SNAPSHOT
[address]  [address] [facility]  [phone]               Thank you for choosing us for your health care visit with Kalie Vera MD.  We are glad to serve you and happy to provide you with this summ What changed:  Another medication with the same name was removed. Continue taking this medication, and follow the directions you see here.    Commonly known as:  SPECTAZOLE           ergocalciferol 74802 units Caps   TAKE ONE CAPSULE BY MOUTH ONCE A WEEK DASH eating plan Adopt a diet rich in fruits, vegetables, and low fat dairy products with reduced content of saturated and total fat.    Dietary sodium reduction Reduce dietary sodium intake to <= 100 mmol per day (2.4 g sodium or 6 g sodium chloride)   Aer